# Patient Record
Sex: FEMALE | Race: ASIAN | URBAN - METROPOLITAN AREA
[De-identification: names, ages, dates, MRNs, and addresses within clinical notes are randomized per-mention and may not be internally consistent; named-entity substitution may affect disease eponyms.]

---

## 2017-05-01 ENCOUNTER — OFFICE VISIT (OUTPATIENT)
Dept: FAMILY MEDICINE | Facility: CLINIC | Age: 42
End: 2017-05-01
Payer: COMMERCIAL

## 2017-05-01 VITALS
TEMPERATURE: 98 F | HEART RATE: 73 BPM | BODY MASS INDEX: 21.47 KG/M2 | SYSTOLIC BLOOD PRESSURE: 92 MMHG | DIASTOLIC BLOOD PRESSURE: 57 MMHG | HEIGHT: 62 IN | WEIGHT: 116.7 LBS

## 2017-05-01 DIAGNOSIS — E55.9 VITAMIN D DEFICIENCY: ICD-10-CM

## 2017-05-01 DIAGNOSIS — R53.83 FATIGUE, UNSPECIFIED TYPE: Primary | ICD-10-CM

## 2017-05-01 DIAGNOSIS — R42 DIZZINESS: ICD-10-CM

## 2017-05-01 LAB
DEPRECATED CALCIDIOL+CALCIFEROL SERPL-MC: 26 UG/L (ref 20–75)
FERRITIN SERPL-MCNC: 231 NG/ML (ref 12–150)
HGB BLD-MCNC: 14 G/DL (ref 11.7–15.7)
IRON SATN MFR SERPL: 50 % (ref 15–46)
IRON SERPL-MCNC: 129 UG/DL (ref 35–180)
T4 FREE SERPL-MCNC: 1.06 NG/DL (ref 0.76–1.46)
TIBC SERPL-MCNC: 256 UG/DL (ref 240–430)
TSH SERPL DL<=0.005 MIU/L-ACNC: 0.37 MU/L (ref 0.4–4)

## 2017-05-01 PROCEDURE — 36415 COLL VENOUS BLD VENIPUNCTURE: CPT | Performed by: NURSE PRACTITIONER

## 2017-05-01 PROCEDURE — 82306 VITAMIN D 25 HYDROXY: CPT | Performed by: NURSE PRACTITIONER

## 2017-05-01 PROCEDURE — 84439 ASSAY OF FREE THYROXINE: CPT | Performed by: NURSE PRACTITIONER

## 2017-05-01 PROCEDURE — 83550 IRON BINDING TEST: CPT | Performed by: NURSE PRACTITIONER

## 2017-05-01 PROCEDURE — 83540 ASSAY OF IRON: CPT | Performed by: NURSE PRACTITIONER

## 2017-05-01 PROCEDURE — 84443 ASSAY THYROID STIM HORMONE: CPT | Performed by: NURSE PRACTITIONER

## 2017-05-01 PROCEDURE — 82728 ASSAY OF FERRITIN: CPT | Performed by: NURSE PRACTITIONER

## 2017-05-01 PROCEDURE — 85018 HEMOGLOBIN: CPT | Performed by: NURSE PRACTITIONER

## 2017-05-01 PROCEDURE — 99214 OFFICE O/P EST MOD 30 MIN: CPT | Performed by: NURSE PRACTITIONER

## 2017-05-01 RX ORDER — FERROUS SULFATE 325(65) MG
325 TABLET ORAL
Qty: 100 TABLET | Refills: 0 | Status: SHIPPED | OUTPATIENT
Start: 2017-05-01 | End: 2017-05-01 | Stop reason: DRUGHIGH

## 2017-05-01 RX ORDER — MECLIZINE HYDROCHLORIDE 25 MG/1
25 TABLET ORAL 3 TIMES DAILY PRN
Qty: 30 TABLET | Refills: 0 | Status: SHIPPED | OUTPATIENT
Start: 2017-05-01 | End: 2017-11-16

## 2017-05-01 ASSESSMENT — ANXIETY QUESTIONNAIRES
6. BECOMING EASILY ANNOYED OR IRRITABLE: SEVERAL DAYS
2. NOT BEING ABLE TO STOP OR CONTROL WORRYING: NOT AT ALL
3. WORRYING TOO MUCH ABOUT DIFFERENT THINGS: NOT AT ALL
4. TROUBLE RELAXING: SEVERAL DAYS
5. BEING SO RESTLESS THAT IT IS HARD TO SIT STILL: SEVERAL DAYS
GAD7 TOTAL SCORE: 4
7. FEELING AFRAID AS IF SOMETHING AWFUL MIGHT HAPPEN: NOT AT ALL
1. FEELING NERVOUS, ANXIOUS, OR ON EDGE: SEVERAL DAYS

## 2017-05-01 NOTE — PROGRESS NOTES
"  SUBJECTIVE:                                                    Soni Moody is a 41 year old female who presents to clinic today for the following health issues:chronic fatigue, feeling lightheaded sometimes, feels rooms spinning around with position changes occasionally. Denies shortness of breath, palpitations, chest pain, fever, nausea, abdominal pain, diarrhea, flank pain, hematuria, blood in stools. Denies headaches, blurry vision, or vision changes.     Reports history of low iron level in the past, was taking Ferrous sulfate 325 mg daily     Problem list and histories reviewed & adjusted, as indicated.  Additional history: as documented    Labs reviewed in EPIC    Reviewed and updated as needed this visit by clinical staff  Tobacco  Allergies  Med Hx  Surg Hx  Fam Hx  Soc Hx      Reviewed and updated as needed this visit by Provider         ROS:  Constitutional, HEENT, cardiovascular, pulmonary, gi and gu systems are negative, except as otherwise noted.    OBJECTIVE:                                                    BP 92/57  Pulse 73  Temp 98  F (36.7  C) (Tympanic)  Ht 5' 2.25\" (1.581 m)  Wt 116 lb 11.2 oz (52.9 kg)  BMI 21.17 kg/m2  Body mass index is 21.17 kg/(m^2).  GENERAL: healthy, alert and no distress  HENT: ear canals and TM's normal, nose and mouth without ulcers or lesions  NECK: no adenopathy, no asymmetry, masses, or scars and thyroid normal to palpation  RESP: lungs clear to auscultation - no rales, rhonchi or wheezes  CV: regular rate and rhythm, normal S1 S2, no S3 or S4, no murmur, click or rub, no peripheral edema and peripheral pulses strong  ABDOMEN: soft, nontender, no hepatosplenomegaly, no masses and bowel sounds normal  NEURO: Normal strength and tone, mentation intact and speech normal  PSYCH: mentation appears normal, affect normal/bright    Diagnostic Test Results:  Hemoglobin, Iron binding cap  Ferritin  TSH  Vitamin D level   All labs are pending    "     ASSESSMENT/PLAN:                                                      1. Fatigue, unspecified type  - ferrous sulfate (FEOSOL) 325 (65 FE) MG tablet; Take 1 tablet (325 mg) by mouth daily (with breakfast) Reported on 5/1/2017  Dispense: 100 tablet; Refill: 0-d/c, ferritin level is elevated  - Ferritin  - Hemoglobin  - Iron and iron binding capacity  - TSH with free T4 reflex  -TSH slightly suppressed, recommend to recheck in 1 week      2. Vitamin D deficiency  - Vitamin D Deficiency    3. Dizziness  -recommended to drink more fluids  -lab results reviewed from last visit, all labs were normal      - meclizine (ANTIVERT) 25 MG tablet; Take 1 tablet (25 mg) by mouth 3 times daily as needed for dizziness, or vertigo  Dispense: 30 tablet; Refill: 0    See Patient Instructions    MOISES Caballero [facility]

## 2017-05-01 NOTE — NURSING NOTE
"Chief Complaint   Patient presents with     other     Ever since last Friday she has had no energy, thinks her iron is low.      Refill Request     ferrous sulfate and calcium.       Initial BP 92/57  Pulse 73  Temp 98  F (36.7  C) (Tympanic)  Ht 5' 2.25\" (1.581 m)  Wt 116 lb 11.2 oz (52.9 kg)  BMI 21.17 kg/m2 Estimated body mass index is 21.17 kg/(m^2) as calculated from the following:    Height as of this encounter: 5' 2.25\" (1.581 m).    Weight as of this encounter: 116 lb 11.2 oz (52.9 kg).  Medication Reconciliation: complete  "

## 2017-05-01 NOTE — PATIENT INSTRUCTIONS
Iron  Hemoglobin  Thyroid  Vitamin D level    Drink more fluids  For dizziness try Meclizine 1 tablet 3 times daily as needed

## 2017-05-01 NOTE — MR AVS SNAPSHOT
"              After Visit Summary   5/1/2017    Soni Moody    MRN: [id number]           Patient Information     Date Of Birth          1975        Visit Information        Provider Department      5/1/2017 8:40 AM Parul Nieves APRN CNP [facility]        Today's Diagnoses     Fatigue, unspecified type    -  1    Vitamin D deficiency        Dizziness          Care Instructions    Iron  Hemoglobin  Thyroid  Vitamin D level    Drink more fluids  For dizziness try Meclizine 1 tablet 3 times daily as needed           Follow-ups after your visit        Who to contact     If you have questions or need follow up information about today's clinic visit or your schedule please contact [facility] directly at [phone].  Normal or non-critical lab and imaging results will be communicated to you by gumihart, letter or phone within 4 business days after the clinic has received the results. If you do not hear from us within 7 days, please contact the clinic through gumihart or phone. If you have a critical or abnormal lab result, we will notify you by phone as soon as possible.  Submit refill requests through "[employer] or call your pharmacy and they will forward the refill request to us. Please allow 3 business days for your refill to be completed.          Additional Information About Your Visit        MyChart Information     "[employer] lets you send messages to your doctor, view your test results, renew your prescriptions, schedule appointments and more. To sign up, go to [email or web address] . Click on \"Log in\" on the left side of the screen, which will take you to the Welcome page. Then click on \"Sign up Now\" on the right side of the page.     You will be asked to enter the access code listed below, as well as some personal information. Please follow the directions to create your username and password.     Your access code is: [email or web address]  Expires: 7/30/2017  9:07 AM     Your " "access code will  in 90 days. If you need help or a new code, please call your [place] clinic or [phone].        Care EveryWhere ID     This is your Care EveryWhere ID. This could be used by other organizations to access your [place] medical records  [id number]        Your Vitals Were     Pulse Temperature Height BMI (Body Mass Index)          73 98  F (36.7  C) (Tympanic) 5' 2.25\" (1.581 m) 21.17 kg/m2         Blood Pressure from Last 3 Encounters:   17 92/57   16 91/63   16 109/78    Weight from Last 3 Encounters:   17 116 lb 11.2 oz (52.9 kg)   16 116 lb (52.6 kg)   16 115 lb (52.2 kg)              We Performed the Following     Ferritin     Hemoglobin     Iron and iron binding capacity     TSH with free T4 reflex     Vitamin D Deficiency          Today's Medication Changes          These changes are accurate as of: 17  9:13 AM.  If you have any questions, ask your nurse or doctor.               Start taking these medicines.        Dose/Directions    meclizine 25 MG tablet   Commonly known as:  ANTIVERT   Used for:  Dizziness   Started by:  Parul Nieves APRN CNP        Dose:  25 mg   Take 1 tablet (25 mg) by mouth 3 times daily as needed for dizziness   Quantity:  30 tablet   Refills:  0         These medicines have changed or have updated prescriptions.        Dose/Directions    ferrous sulfate 325 (65 FE) MG tablet   Commonly known as:  FEOSOL   This may have changed:  how much to take   Used for:  Fatigue, unspecified type   Changed by:  Parul Nieves APRN CNP        Dose:  325 mg   Take 1 tablet (325 mg) by mouth daily (with breakfast) Reported on 2017   Quantity:  100 tablet   Refills:  0            Where to get your medicines      These medications were sent to [facility] [place], [place] - [address]  [address] [address]     Phone:  [phone]     ferrous sulfate 325 (65 FE) MG tablet    " meclizine 25 MG tablet                Primary Care Provider    None Specified       No primary provider on file.        Thank you!     Thank you for choosing [facility]  for your care. Our goal is always to provide you with excellent care. Hearing back from our patients is one way we can continue to improve our services. Please take a few minutes to complete the written survey that you may receive in the mail after your visit with us. Thank you!             Your Updated Medication List - Protect others around you: Learn how to safely use, store and throw away your medicines at www.disposemymeds.org.          This list is accurate as of: 5/1/17  9:13 AM.  Always use your most recent med list.                   Brand Name Dispense Instructions for use    CALCIUM 600 + D 600-200 MG-UNIT Tabs      1 qd       ferrous sulfate 325 (65 FE) MG tablet    FEOSOL    100 tablet    Take 1 tablet (325 mg) by mouth daily (with breakfast) Reported on 5/1/2017       meclizine 25 MG tablet    ANTIVERT    30 tablet    Take 1 tablet (25 mg) by mouth 3 times daily as needed for dizziness       NO ACTIVE MEDICATIONS

## 2017-05-02 ASSESSMENT — ANXIETY QUESTIONNAIRES: GAD7 TOTAL SCORE: 4

## 2017-05-02 ASSESSMENT — PATIENT HEALTH QUESTIONNAIRE - PHQ9: SUM OF ALL RESPONSES TO PHQ QUESTIONS 1-9: 6

## 2017-05-08 DIAGNOSIS — R53.83 FATIGUE, UNSPECIFIED TYPE: ICD-10-CM

## 2017-05-08 LAB — TSH SERPL DL<=0.05 MIU/L-ACNC: 0.53 MU/L (ref 0.4–4)

## 2017-05-08 PROCEDURE — 84443 ASSAY THYROID STIM HORMONE: CPT | Performed by: NURSE PRACTITIONER

## 2017-05-08 PROCEDURE — 36415 COLL VENOUS BLD VENIPUNCTURE: CPT | Performed by: NURSE PRACTITIONER

## 2017-11-16 ENCOUNTER — OFFICE VISIT (OUTPATIENT)
Dept: FAMILY MEDICINE | Facility: CLINIC | Age: 42
End: 2017-11-16
Payer: COMMERCIAL

## 2017-11-16 VITALS
DIASTOLIC BLOOD PRESSURE: 70 MMHG | WEIGHT: 121.8 LBS | SYSTOLIC BLOOD PRESSURE: 100 MMHG | HEIGHT: 62 IN | TEMPERATURE: 98.9 F | HEART RATE: 63 BPM | BODY MASS INDEX: 22.41 KG/M2

## 2017-11-16 DIAGNOSIS — Z80.0 FAMILY HISTORY OF LIVER CANCER: ICD-10-CM

## 2017-11-16 DIAGNOSIS — Z23 NEED FOR PROPHYLACTIC VACCINATION AND INOCULATION AGAINST INFLUENZA: ICD-10-CM

## 2017-11-16 DIAGNOSIS — Z30.431 IUD CHECK UP: ICD-10-CM

## 2017-11-16 DIAGNOSIS — D35.2 PROLACTINOMA (H): ICD-10-CM

## 2017-11-16 DIAGNOSIS — Z00.00 ROUTINE GENERAL MEDICAL EXAMINATION AT A HEALTH CARE FACILITY: Primary | ICD-10-CM

## 2017-11-16 PROCEDURE — 90471 IMMUNIZATION ADMIN: CPT | Performed by: FAMILY MEDICINE

## 2017-11-16 PROCEDURE — 90686 IIV4 VACC NO PRSV 0.5 ML IM: CPT | Performed by: FAMILY MEDICINE

## 2017-11-16 PROCEDURE — 99395 PREV VISIT EST AGE 18-39: CPT | Mod: 25 | Performed by: FAMILY MEDICINE

## 2017-11-16 NOTE — PROGRESS NOTES
SUBJECTIVE:   CC: Soni Moody is an 41 year old woman who presents for preventive health visit.     Healthy Habits:    Do you get at least three servings of calcium containing foods daily (dairy, green leafy vegetables, etc.)? yes    Amount of exercise or daily activities, outside of work: none    Problems taking medications regularly not applicable    Medication side effects: No    Have you had an eye exam in the past two years? About due    Do you see a dentist twice per year? no    Do you have sleep apnea, excessive snoring or daytime drowsiness?no        Today's PHQ-2 Score:   PHQ-2 ( 1999 [employer]) 11/16/2017 6/1/2016   Q1: Little interest or pleasure in doing things 0 0   Q2: Feeling down, depressed or hopeless 0 0   PHQ-2 Score 0 0         Abuse: Current or Past(Physical, Sexual or Emotional)- No  Do you feel safe in your environment - Yes  Social History   Substance Use Topics     Smoking status: Never Smoker     Smokeless tobacco: Never Used     Alcohol use No     The patient does not drink >3 drinks per day nor >7 drinks per week.    Reviewed orders with patient.  Reviewed health maintenance and updated orders accordingly - Yes  Labs reviewed in EPIC    Mammogram not appropriate for this patient based on age.    Pertinent mammograms are reviewed under the imaging tab.  History of abnormal Pap smear:   Last 3 Pap Results:   PAP (no units)   Date Value   03/29/2016 NIL   04/30/2013 NIL       Reviewed and updated as needed this visit by clinical staff  Tobacco  Allergies  Med Hx  Surg Hx  Fam Hx  Soc Hx        Reviewed and updated as needed this visit by Provider        Past Medical History:   Diagnosis Date     IUD (intrauterine device) in place     paragard IUD 3/05/09     Other and unspecified anterior pituitary hyperfunction      Prolactinoma (H)     pitutary abnormally, medicine to shink prolaction      Unspecified disorder of the pituitary gland and its hypothalamic control     pituitary  "tumor.      Past Surgical History:   Procedure Laterality Date     HC ENLARGE BREAST WITH IMPLANT       MA BREAST BILATERAL SCREEN ANALOG NON DIGITAL       Obstetric History       T0      L0     SAB0   TAB1   Ectopic0   Multiple0   Live Births0       # Outcome Date GA Lbr Scott/2nd Weight Sex Delivery Anes PTL Lv   4 Para 2009           3 Para 2006           2             1 TAB                   ROS:  C: NEGATIVE for fever, chills, change in weight  I: NEGATIVE for worrisome rashes, moles or lesions  E: NEGATIVE for vision changes or irritation  ENT: NEGATIVE for ear, mouth and throat problems  R: NEGATIVE for significant cough or SOB  B: NEGATIVE for masses, tenderness or discharge  CV: NEGATIVE for chest pain, palpitations or peripheral edema  GI: NEGATIVE for nausea, abdominal pain, heartburn, or change in bowel habits  : NEGATIVE for unusual urinary or vaginal symptoms. Periods are regular.  M: NEGATIVE for significant arthralgias or myalgia  N: NEGATIVE for weakness, dizziness or paresthesias  P: NEGATIVE for changes in mood or affect    OBJECTIVE:   /70  Pulse 63  Temp 98.9  F (37.2  C) (Tympanic)  Ht 5' 2.25\" (1.581 m)  Wt 121 lb 12.8 oz (55.2 kg)  LMP 10/28/2017 (Approximate)  BMI 22.1 kg/m2  EXAM:  GENERAL: healthy, alert and no distress  EYES: Eyes grossly normal to inspection, PERRL and conjunctivae and sclerae normal  HENT: ear canals and TM's normal, nose and mouth without ulcers or lesions  NECK: no adenopathy, no asymmetry, masses, or scars and thyroid normal to palpation  RESP: lungs clear to auscultation - no rales, rhonchi or wheezes  BREAST: normal without masses, tenderness or nipple discharge and no palpable axillary masses or adenopathy  CV: regular rate and rhythm, normal S1 S2, no S3 or S4, no murmur, click or rub, no peripheral edema and peripheral pulses strong  ABDOMEN: soft, nontender, no hepatosplenomegaly, no masses and bowel sounds normal   " "(female): normal female external genitalia, normal urethral meatus , vaginal mucosa pink, moist, well rugated and normal cervix, adnexae, and uterus without masses.  MS: no gross musculoskeletal defects noted, no edema  SKIN: no suspicious lesions or rashes  NEURO: Normal strength and tone, mentation intact and speech normal  PSYCH: mentation appears normal, affect normal/bright    ASSESSMENT/PLAN:   1. Routine general medical examination at a health care facility  Low risk cervical cancer, low risk breast cancer.    2. IUD check up  9 years with paraguard, needs replacement  - OB/GYN REFERRAL    3. Family history of liver cancer  Mother, labs last year were normal, no need to recheck today    4. Prolactinoma (H)  Prolactin normal in June, 16, normal.      COUNSELING:   Reviewed preventive health counseling, as reflected in patient instructions         reports that she has never smoked. She has never used smokeless tobacco.    Estimated body mass index is 22.1 kg/(m^2) as calculated from the following:    Height as of this encounter: 5' 2.25\" (1.581 m).    Weight as of this encounter: 121 lb 12.8 oz (55.2 kg).   Weight management plan noted, stable and monitoring    Counseling Resources:  ATP IV Guidelines  Pooled Cohorts Equation Calculator  Breast Cancer Risk Calculator  FRAX Risk Assessment  ICSI Preventive Guidelines  Dietary Guidelines for Americans, 2010  USDA's MyPlate  ASA Prophylaxis  Lung CA Screening    Neva Acevedo MD  [facility]  "

## 2017-11-16 NOTE — MR AVS SNAPSHOT
After Visit Summary   11/16/2017    Soni Moody    MRN: [id number]           Patient Information     Date Of Birth          1975        Visit Information        Provider Department      11/16/2017 8:00 AM Neva Acevedo MD [facility]        Today's Diagnoses     IUD check up    -  1      Care Instructions      Preventive Health Recommendations  Female Ages 26 - 39  Yearly exam:   See your health care provider every year in order to    Review health changes.     Discuss preventive care.      Review your medicines if you your doctor has prescribed any.    Until age 30: Get a Pap test every three years (more often if you have had an abnormal result).    After age 30: Talk to your doctor about whether you should have a Pap test every 3 years or have a Pap test with HPV screening every 5 years.   You do not need a Pap test if your uterus was removed (hysterectomy) and you have not had cancer.  You should be tested each year for STDs (sexually transmitted diseases), if you're at risk.   Talk to your provider about how often to have your cholesterol checked.  If you are at risk for diabetes, you should have a diabetes test (fasting glucose).  Shots: Get a flu shot each year. Get a tetanus shot every 10 years.   Nutrition:     Eat at least 5 servings of fruits and vegetables each day.    Eat whole-grain bread, whole-wheat pasta and brown rice instead of white grains and rice.    Talk to your provider about Calcium and Vitamin D.     Lifestyle    Exercise at least 150 minutes a week (30 minutes a day, 5 days of the week). This will help you control your weight and prevent disease.    Limit alcohol to one drink per day.    No smoking.     Wear sunscreen to prevent skin cancer.    See your dentist every six months for an exam and cleaning.            Follow-ups after your visit        Additional Services     OB/GYN REFERRAL       Your provider has referred you to:  NESTOR: Elizabeth  "[facility] [phone]   [email or web address]    IUD replacement    Please be aware that coverage of these services is subject to the terms and limitations of your health insurance plan.  Call member services at your health plan with any benefit or coverage questions.      Please bring the following with you to your appointment:    (1) Any X-Rays, CTs or MRIs which have been performed.  Contact the facility where they were done to arrange for  prior to your scheduled appointment.   (2) List of current medications   (3) This referral request   (4) Any documents/labs given to you for this referral                  Who to contact     If you have questions or need follow up information about today's clinic visit or your schedule please contact [facility] directly at [phone].  Normal or non-critical lab and imaging results will be communicated to you by MyChart, letter or phone within 4 business days after the clinic has received the results. If you do not hear from us within 7 days, please contact the clinic through MyChart or phone. If you have a critical or abnormal lab result, we will notify you by phone as soon as possible.  Submit refill requests through [employer] or call your pharmacy and they will forward the refill request to us. Please allow 3 business days for your refill to be completed.          Additional Information About Your Visit        Message [employer] customer insight Information     [employer] lets you send messages to your doctor, view your test results, renew your prescriptions, schedule appointments and more. To sign up, go to [email or web address] . Click on \"Log in\" on the left side of the screen, which will take you to the Welcome page. Then click on \"Sign up Now\" on the right side of the page.     You will be asked to enter the access code listed below, as well as some personal information. Please follow the directions to create your username and password.   " "  Your access code is: [email or web address]  Expires: 2018  8:30 AM     Your access code will  in 90 days. If you need help or a new code, please call your [place] clinic or [phone].        Care EveryWhere ID     This is your Care EveryWhere ID. This could be used by other organizations to access your [place] medical records  [id number]        Your Vitals Were     Pulse Temperature Height Last Period BMI (Body Mass Index)       63 98.9  F (37.2  C) (Tympanic) 5' 2.25\" (1.581 m) 10/28/2017 (Approximate) 22.1 kg/m2        Blood Pressure from Last 3 Encounters:   17 100/70   17 92/57   16 91/63    Weight from Last 3 Encounters:   17 121 lb 12.8 oz (55.2 kg)   17 116 lb 11.2 oz (52.9 kg)   16 116 lb (52.6 kg)              We Performed the Following     OB/GYN REFERRAL          Today's Medication Changes          These changes are accurate as of: 17  8:30 AM.  If you have any questions, ask your nurse or doctor.               Stop taking these medicines if you haven't already. Please contact your care team if you have questions.     meclizine 25 MG tablet   Commonly known as:  ANTIVERT   Stopped by:  Neva Acevedo MD                    Primary Care Provider Office Phone # Fax #    Neva Acevedo [phone] [address] [address]        Equal Access to Services     [facility]: Hadii nuria elizalde hadasho Sotod, waaxda luqadaha, qaybta kaalmaromario agarwal idiin hayaan adeeg kharash la'aan . So [facility] [phone].    ATENCIÓN: Si habla español, tiene a haynes disposición servicios gratuitos de asistencia lingüística. Llame al [phone].    We comply with applicable federal civil rights laws and [place] laws. We do not discriminate on the basis of race, color, national origin, age, disability, sex, sexual orientation, or gender identity.            Thank you!     Thank you for choosing [facility]  for " your care. Our goal is always to provide you with excellent care. Hearing back from our patients is one way we can continue to improve our services. Please take a few minutes to complete the written survey that you may receive in the mail after your visit with us. Thank you!             Your Updated Medication List - Protect others around you: Learn how to safely use, store and throw away your medicines at www.disposemymeds.org.          This list is accurate as of: 11/16/17  8:30 AM.  Always use your most recent med list.                   Brand Name Dispense Instructions for use Diagnosis    CALCIUM 600 + D 600-200 MG-UNIT Tabs      1 qd        NO ACTIVE MEDICATIONS

## 2017-11-16 NOTE — NURSING NOTE
"Initial /70  Pulse 63  Temp 98.9  F (37.2  C) (Tympanic)  Ht 5' 2.25\" (1.581 m)  Wt 121 lb 12.8 oz (55.2 kg)  LMP 10/28/2017 (Approximate)  BMI 22.1 kg/m2 Estimated body mass index is 22.1 kg/(m^2) as calculated from the following:    Height as of this encounter: 5' 2.25\" (1.581 m).    Weight as of this encounter: 121 lb 12.8 oz (55.2 kg). .      "

## 2017-11-16 NOTE — PROGRESS NOTES

## 2018-02-19 ENCOUNTER — OFFICE VISIT (OUTPATIENT)
Dept: OBGYN | Facility: CLINIC | Age: 43
End: 2018-02-19
Payer: COMMERCIAL

## 2018-02-19 VITALS
BODY MASS INDEX: 20.98 KG/M2 | SYSTOLIC BLOOD PRESSURE: 104 MMHG | DIASTOLIC BLOOD PRESSURE: 68 MMHG | TEMPERATURE: 98.4 F | RESPIRATION RATE: 16 BRPM | HEIGHT: 62 IN | WEIGHT: 114 LBS | HEART RATE: 89 BPM

## 2018-02-19 DIAGNOSIS — Z30.430 ENCOUNTER FOR INSERTION OF MIRENA IUD: ICD-10-CM

## 2018-02-19 DIAGNOSIS — Z30.430 ENCOUNTER FOR INSERTION OF MIRENA IUD: Primary | ICD-10-CM

## 2018-02-19 DIAGNOSIS — Z30.433 ENCOUNTER FOR REMOVAL AND REINSERTION OF IUD: ICD-10-CM

## 2018-02-19 PROCEDURE — 58300 INSERT INTRAUTERINE DEVICE: CPT | Performed by: OBSTETRICS & GYNECOLOGY

## 2018-02-19 PROCEDURE — 58301 REMOVE INTRAUTERINE DEVICE: CPT | Performed by: OBSTETRICS & GYNECOLOGY

## 2018-02-19 NOTE — MR AVS SNAPSHOT
"              After Visit Summary   2018    Soni Moody    MRN: [id number]           Patient Information     Date Of Birth          1975        Visit Information        Provider Department      2018 8:15 AM Keli Rebollar MD [facility]        Today's Diagnoses     Encounter for insertion of mirena IUD    -  1    Encounter for removal and reinsertion of IUD        mirena IUD           Follow-ups after your visit        Who to contact     If you have questions or need follow up information about today's clinic visit or your schedule please contact [facility] directly at [phone].  Normal or non-critical lab and imaging results will be communicated to you by [employer], letter or phone within 4 business days after the clinic has received the results. If you do not hear from us within 7 days, please contact the clinic through [employer] or phone. If you have a critical or abnormal lab result, we will notify you by phone as soon as possible.  Submit refill requests through [employer] or call your pharmacy and they will forward the refill request to us. Please allow 3 business days for your refill to be completed.          Additional Information About Your Visit        MyChart Information     [employer] lets you send messages to your doctor, view your test results, renew your prescriptions, schedule appointments and more. To sign up, go to [email or web address] . Click on \"Log in\" on the left side of the screen, which will take you to the Welcome page. Then click on \"Sign up Now\" on the right side of the page.     You will be asked to enter the access code listed below, as well as some personal information. Please follow the directions to create your username and password.     Your access code is: [email or web address]  Expires: 2018  8:59 AM     Your access code will  in 90 days. If you need help or a new code, please call your [facility] or [phone].      " "  Care EveryWhere ID     This is your Care EveryWhere ID. This could be used by other organizations to access your [place] medical records  [id number]        Your Vitals Were     Pulse Temperature Respirations Height Breastfeeding? BMI (Body Mass Index)    89 98.4  F (36.9  C) 16 5' 2.25\" (1.581 m) No 20.68 kg/m2       Blood Pressure from Last 3 Encounters:   02/19/18 104/68   11/16/17 100/70   05/01/17 92/57    Weight from Last 3 Encounters:   02/19/18 114 lb (51.7 kg)   11/16/17 121 lb 12.8 oz (55.2 kg)   05/01/17 116 lb 11.2 oz (52.9 kg)              We Performed the Following     HC LEVONORGESTREL IU 52MG 5 YR     INSERTION INTRAUTERINE DEVICE     REMOVE INTRAUTERINE DEVICE          Today's Medication Changes          These changes are accurate as of 2/19/18  8:59 AM.  If you have any questions, ask your nurse or doctor.               Start taking these medicines.        Dose/Directions    levonorgestrel 20 MCG/24HR IUD   Commonly known as:  MIRENA   Used for:  Encounter for insertion of mirena IUD   Started by:  Keli Rebollar MD        Dose:  1 each   1 each (20 mcg) by Intrauterine route continuous   Refills:  0            Where to get your medicines      Some of these will need a paper prescription and others can be bought over the counter.  Ask your nurse if you have questions.     You don't need a prescription for these medications     levonorgestrel 20 MCG/24HR IUD                Primary Care Provider Office Phone # Fax #    Neva Tierney Acevedo [phone] [address] [facility] [address]        Equal Access to Services     KANDI LOYD : Hadrogelio Montes, yahir mckeon, romario messer. So [facility] [phone].    ATENCIÓN: Si habla español, tiene a haynes disposición servicios gratuitos de asistencia lingüística. Llame al [phone].    We comply with applicable federal civil rights laws and [place] " laws. We do not discriminate on the basis of race, color, national origin, age, disability, sex, sexual orientation, or gender identity.            Thank you!     Thank you for choosing [facility]  for your care. Our goal is always to provide you with excellent care. Hearing back from our patients is one way we can continue to improve our services. Please take a few minutes to complete the written survey that you may receive in the mail after your visit with us. Thank you!             Your Updated Medication List - Protect others around you: Learn how to safely use, store and throw away your medicines at www.disposemymeds.org.          This list is accurate as of 2/19/18  8:59 AM.  Always use your most recent med list.                   Brand Name Dispense Instructions for use Diagnosis    CALCIUM 600 + D 600-200 MG-UNIT Tabs      1 qd        levonorgestrel 20 MCG/24HR IUD    MIRENA     1 each (20 mcg) by Intrauterine route continuous    Encounter for insertion of mirena IUD       NO ACTIVE MEDICATIONS

## 2018-02-19 NOTE — NURSING NOTE
"Chief Complaint   Patient presents with     Minor Procedure       Initial /68 (BP Location: Right arm, Patient Position: Sitting, Cuff Size: Adult Regular)  Pulse 89  Temp 98.4  F (36.9  C)  Resp 16  Ht 5' 2.25\" (1.581 m)  Wt 114 lb (51.7 kg)  Breastfeeding? No  BMI 20.68 kg/m2 Estimated body mass index is 20.68 kg/(m^2) as calculated from the following:    Height as of this encounter: 5' 2.25\" (1.581 m).    Weight as of this encounter: 114 lb (51.7 kg).  Medication Reconciliation: complete   Pattie Sauer CMA      "

## 2018-02-19 NOTE — PROGRESS NOTES
"  SUBJECTIVE:                                                   CC:  Patient presents with:  Minor Procedure      HPI:  Soni Moody is a 42 year old  who presents for removal and reinsertion of IUD.  She has the paragard IUD, placed 9 years ago.  No signs of menopause yet, still gets regular periods on the IUD.   Not due for pap smear until next year.  No concern for STI, declines any testing of that today.  Gets regular annual visits with family medicine.      ROS: 10 point ROS negative other than as listed above in HPI.    Gyn History:  Patient is sexually active.  Using IUD for contraception.   Recent pap smears:    Lab Results   Component Value Date    PAP NIL 2016    PAP NIL 2013       PMH, PSH, Soc Hx, Fam Hx, Meds, and allergies reviewed in Epic.    OBJECTIVE:     /68 (BP Location: Right arm, Patient Position: Sitting, Cuff Size: Adult Regular)  Pulse 89  Temp 98.4  F (36.9  C)  Resp 16  Ht 5' 2.25\" (1.581 m)  Wt 114 lb (51.7 kg)  Breastfeeding? No  BMI 20.68 kg/m2    Gen: Healthy appearing thin female, no acute distress, comfortable  HENT: No scleral injection or icterus  CV: Regular rate  Resp: Normal work of breathing, no cough  GI: Abdomen soft, non-tender. No masses, organomegaly  Skin: No suspicious lesions or rashes  Psychiatric: mentation appears normal and affect bright  : Normal external female genitalia.  No external lesions, normal hair distribution.   SSE: Speculum exam reveals vaginal epithelium well rugated with normal physiologic discharge. Cervix appears smooth, pink, patulous, with no visible lesions.  White paragard IUD strings visualized.    IUD Insertion Procedure Note  2018     The patient was counseled on the risks, benefits, and alternatives of the procedure. Verbal and written consent were obtained.  Discussed r/b/a of Mirena versus paragard IUD; she desires to try the Mirena IUD.    The patient was placed in the dorsal lithotomy position.  A " bimanual exam revealed an anteverted uterus.  A speculum was inserted without difficulty.  The white strings of the paragard were visualized and grasped.  The entire IUD was removed intact.  The cervix was cleaned with betadine.  The uterus was then sounded to 7cm using the endometrial pipelle. A Mirena IUD was inserted in a sterile fashion and placed in the uterus with a 3cm tail. The patient tolerated the procedure with no complications.     Keli Rebollar MD      ASSESSMENT/PLAN:                                                      1. Encounter for insertion of mirena IUD  The patient was instructed to return to clinic in three to four weeks to check the length of the strings if she could not feel them herself at home. Also instructed to call with symptoms of infection such as a fever, heavy bleeding, or severe pain not controlled with over the counter medication. She was advised to use ibuprofen as needed for mild to moderate pain.  She was counseled that the IUD does not protect against STIs and that she will need to have a new device placed in 5 years.  All pt questions were answered.    - HC LEVONORGESTREL IU 52MG 5 YR  - levonorgestrel (MIRENA) 20 MCG/24HR IUD; 1 each (20 mcg) by Intrauterine route continuous  - INSERTION INTRAUTERINE DEVICE    2. Encounter for removal and reinsertion of IUD  She was initially unsure which type of IUD she desired to have placed, but after counseling on the risks and benefits of different types, elected for the Mirena IUD (s/p insertion as above).  - REMOVE INTRAUTERINE DEVICE      Keli Rebollar MD, MPH  Obstetrics and Gynecology

## 2018-10-22 ENCOUNTER — OFFICE VISIT (OUTPATIENT)
Dept: FAMILY MEDICINE | Facility: CLINIC | Age: 43
End: 2018-10-22
Payer: COMMERCIAL

## 2018-10-22 VITALS
DIASTOLIC BLOOD PRESSURE: 58 MMHG | WEIGHT: 112.6 LBS | OXYGEN SATURATION: 99 % | BODY MASS INDEX: 20.43 KG/M2 | HEART RATE: 94 BPM | SYSTOLIC BLOOD PRESSURE: 90 MMHG | TEMPERATURE: 98.4 F

## 2018-10-22 DIAGNOSIS — R61 NIGHT SWEATS: ICD-10-CM

## 2018-10-22 DIAGNOSIS — R53.83 FATIGUE, UNSPECIFIED TYPE: ICD-10-CM

## 2018-10-22 DIAGNOSIS — Z23 NEED FOR PROPHYLACTIC VACCINATION AND INOCULATION AGAINST INFLUENZA: Primary | ICD-10-CM

## 2018-10-22 DIAGNOSIS — R79.89 PROLACTIN INCREASED: ICD-10-CM

## 2018-10-22 DIAGNOSIS — D35.2 PROLACTINOMA (H): ICD-10-CM

## 2018-10-22 LAB
ALBUMIN SERPL-MCNC: 3.3 G/DL (ref 3.4–5)
ALP SERPL-CCNC: 38 U/L (ref 40–150)
ALT SERPL W P-5'-P-CCNC: 125 U/L (ref 0–50)
ANION GAP SERPL CALCULATED.3IONS-SCNC: 4 MMOL/L (ref 3–14)
AST SERPL W P-5'-P-CCNC: 66 U/L (ref 0–45)
BASOPHILS # BLD AUTO: 0 10E9/L (ref 0–0.2)
BASOPHILS NFR BLD AUTO: 0.7 %
BILIRUB SERPL-MCNC: 1 MG/DL (ref 0.2–1.3)
BUN SERPL-MCNC: 13 MG/DL (ref 7–30)
CALCIUM SERPL-MCNC: 8.7 MG/DL (ref 8.5–10.1)
CHLORIDE SERPL-SCNC: 110 MMOL/L (ref 94–109)
CO2 SERPL-SCNC: 27 MMOL/L (ref 20–32)
CREAT SERPL-MCNC: 0.41 MG/DL (ref 0.52–1.04)
CRP SERPL-MCNC: <2.9 MG/L (ref 0–8)
DIFFERENTIAL METHOD BLD: ABNORMAL
EOSINOPHIL # BLD AUTO: 0 10E9/L (ref 0–0.7)
EOSINOPHIL NFR BLD AUTO: 1.4 %
ERYTHROCYTE [DISTWIDTH] IN BLOOD BY AUTOMATED COUNT: 10.3 % (ref 10–15)
ERYTHROCYTE [SEDIMENTATION RATE] IN BLOOD BY WESTERGREN METHOD: 11 MM/H (ref 0–20)
FSH SERPL-ACNC: 5.1 IU/L
GFR SERPL CREATININE-BSD FRML MDRD: >90 ML/MIN/1.7M2
GLUCOSE SERPL-MCNC: 131 MG/DL (ref 70–99)
HCT VFR BLD AUTO: 36.6 % (ref 35–47)
HGB BLD-MCNC: 12.2 G/DL (ref 11.7–15.7)
LYMPHOCYTES # BLD AUTO: 0.9 10E9/L (ref 0.8–5.3)
LYMPHOCYTES NFR BLD AUTO: 29.2 %
MCH RBC QN AUTO: 32.1 PG (ref 26.5–33)
MCHC RBC AUTO-ENTMCNC: 33.3 G/DL (ref 31.5–36.5)
MCV RBC AUTO: 96 FL (ref 78–100)
MONOCYTES # BLD AUTO: 0.5 10E9/L (ref 0–1.3)
MONOCYTES NFR BLD AUTO: 18.3 %
NEUTROPHILS # BLD AUTO: 1.5 10E9/L (ref 1.6–8.3)
NEUTROPHILS NFR BLD AUTO: 50.4 %
PLATELET # BLD AUTO: 183 10E9/L (ref 150–450)
POTASSIUM SERPL-SCNC: 4.4 MMOL/L (ref 3.4–5.3)
PROLACTIN SERPL-MCNC: 18 UG/L (ref 3–27)
PROT SERPL-MCNC: 6.8 G/DL (ref 6.8–8.8)
RBC # BLD AUTO: 3.8 10E12/L (ref 3.8–5.2)
SODIUM SERPL-SCNC: 141 MMOL/L (ref 133–144)
T4 FREE SERPL-MCNC: 4.01 NG/DL (ref 0.76–1.46)
TSH SERPL DL<=0.005 MIU/L-ACNC: <0.01 MU/L (ref 0.4–4)
WBC # BLD AUTO: 3 10E9/L (ref 4–11)

## 2018-10-22 PROCEDURE — 84146 ASSAY OF PROLACTIN: CPT | Performed by: FAMILY MEDICINE

## 2018-10-22 PROCEDURE — 84443 ASSAY THYROID STIM HORMONE: CPT | Performed by: FAMILY MEDICINE

## 2018-10-22 PROCEDURE — 90471 IMMUNIZATION ADMIN: CPT | Performed by: FAMILY MEDICINE

## 2018-10-22 PROCEDURE — 83001 ASSAY OF GONADOTROPIN (FSH): CPT | Performed by: FAMILY MEDICINE

## 2018-10-22 PROCEDURE — 99214 OFFICE O/P EST MOD 30 MIN: CPT | Mod: 25 | Performed by: FAMILY MEDICINE

## 2018-10-22 PROCEDURE — 36415 COLL VENOUS BLD VENIPUNCTURE: CPT | Performed by: FAMILY MEDICINE

## 2018-10-22 PROCEDURE — 85025 COMPLETE CBC W/AUTO DIFF WBC: CPT | Performed by: FAMILY MEDICINE

## 2018-10-22 PROCEDURE — 86140 C-REACTIVE PROTEIN: CPT | Performed by: FAMILY MEDICINE

## 2018-10-22 PROCEDURE — 87389 HIV-1 AG W/HIV-1&-2 AB AG IA: CPT | Performed by: FAMILY MEDICINE

## 2018-10-22 PROCEDURE — 90686 IIV4 VACC NO PRSV 0.5 ML IM: CPT | Performed by: FAMILY MEDICINE

## 2018-10-22 PROCEDURE — 85652 RBC SED RATE AUTOMATED: CPT | Performed by: FAMILY MEDICINE

## 2018-10-22 PROCEDURE — 84439 ASSAY OF FREE THYROXINE: CPT | Performed by: FAMILY MEDICINE

## 2018-10-22 PROCEDURE — 80053 COMPREHEN METABOLIC PANEL: CPT | Performed by: FAMILY MEDICINE

## 2018-10-22 NOTE — MR AVS SNAPSHOT
"              After Visit Summary   10/22/2018    Soni Moody    MRN: [id number]           Patient Information     Date Of Birth          1975        Visit Information        Provider Department      10/22/2018 8:20 AM Neva Acevedo MD [facility]        Today's Diagnoses     Need for prophylactic vaccination and inoculation against influenza    -  1    Prolactinoma (H)        Prolactin increased (H)        Fatigue, unspecified type        Night sweats           Follow-ups after your visit        Future tests that were ordered for you today     Open Future Orders        Priority Expected Expires Ordered    Hepatitis C antibody Routine  11/21/2018 10/22/2018            Who to contact     If you have questions or need follow up information about today's clinic visit or your schedule please contact [facility] directly at [phone].  Normal or non-critical lab and imaging results will be communicated to you by MyChart, letter or phone within 4 business days after the clinic has received the results. If you do not hear from us within 7 days, please contact the clinic through MyChart or phone. If you have a critical or abnormal lab result, we will notify you by phone as soon as possible.  Submit refill requests through [employer] or call your pharmacy and they will forward the refill request to us. Please allow 3 business days for your refill to be completed.          Additional Information About Your Visit        MyChart Information     [employer] lets you send messages to your doctor, view your test results, renew your prescriptions, schedule appointments and more. To sign up, go to [email or web address] . Click on \"Log in\" on the left side of the screen, which will take you to the Welcome page. Then click on \"Sign up Now\" on the right side of the page.     You will be asked to enter the access code listed below, as well as some personal information. Please follow the " directions to create your username and password.     Your access code is: [email or web address]  Expires: 2019  8:09 AM     Your access code will  in 90 days. If you need help or a new code, please call your [place] clinic or [phone].        Care EveryWhere ID     This is your Care EveryWhere ID. This could be used by other organizations to access your [place] medical records  [id number]        Your Vitals Were     Pulse Temperature Pulse Oximetry BMI (Body Mass Index)          94 98.4  F (36.9  C) (Tympanic) 99% 20.43 kg/m2         Blood Pressure from Last 3 Encounters:   10/22/18 90/58   18 104/68   17 100/70    Weight from Last 3 Encounters:   10/22/18 112 lb 9.6 oz (51.1 kg)   18 114 lb (51.7 kg)   17 121 lb 12.8 oz (55.2 kg)              We Performed the Following     CBC with platelets differential     Comprehensive metabolic panel     CRP inflammation     Erythrocyte sedimentation rate auto     FLU VACCINE, SPLIT VIRUS, IM (QUADRIVALENT) [17896]- >3 YRS     Follicle stimulating hormone     HIV Antigen Antibody Combo     Prolactin     T4 FREE     TSH     Vaccine Administration, Initial [74136]        Primary Care Provider Office Phone # Fax #    Neva Tierney Acevedo [phone] [address] [facility] [address]        Equal Access to Services     KANDI LOYD AH: Hadii aad ku hadasho Soceliaali, waaxda luqadaha, qaybta kaalmada adeegyada, romario alvarez . So [facility] [phone].    ATENCIÓN: Si habla español, tiene a haynes disposición servicios gratuitos de asistencia lingüística. Llame al [phone].    We comply with applicable federal civil rights laws and [place] laws. We do not discriminate on the basis of race, color, national origin, age, disability, sex, sexual orientation, or gender identity.            Thank you!     Thank you for choosing [facility]  for your care. Our goal is always to provide you with  excellent care. Hearing back from our patients is one way we can continue to improve our services. Please take a few minutes to complete the written survey that you may receive in the mail after your visit with us. Thank you!             Your Updated Medication List - Protect others around you: Learn how to safely use, store and throw away your medicines at www.disposemymeds.org.          This list is accurate as of 10/22/18  9:03 AM.  Always use your most recent med list.                   Brand Name Dispense Instructions for use Diagnosis    CALCIUM 600 + D 600-200 MG-UNIT Tabs      1 qd        levonorgestrel 20 MCG/24HR IUD    MIRENA     1 each (20 mcg) by Intrauterine route continuous    Encounter for insertion of mirena IUD       NO ACTIVE MEDICATIONS

## 2018-10-22 NOTE — PROGRESS NOTES

## 2018-10-22 NOTE — PROGRESS NOTES
Thyroid, blood sugar and liver tests are high normal. Need to return to lab for further tests and then see provider next week. Please  notify.        Thank you. MARILYN GUPTA MD

## 2018-10-22 NOTE — PROGRESS NOTES
SUBJECTIVE:                                                    Soni Moody is 42 year old female   Chief Complaint   Patient presents with     Fatigue     Patient c/o sweating, fatigue x 2-3 weeks    Problem list and histories reviewed & adjusted, as indicated.  Additional history: menses began age 13.  No periods now as has mirena IUD.  Weight low but at her usual, would like to gain 10 lbs.  Walks a lot at work.  Sleep is good, no naps.    Patient Active Problem List   Diagnosis     CARDIOVASCULAR SCREENING; LDL GOAL LESS THAN 160     Titers for Hep A and Rubella shows immunity, [facility].     Prolactinoma (H)     Dental infection     Family history of liver cancer     Other anterior pituitary disorders     mirena IUD     Past Surgical History:   Procedure Laterality Date     HC ENLARGE BREAST WITH IMPLANT       MA BREAST BILATERAL SCREEN ANALOG NON DIGITAL         Social History   Substance Use Topics     Smoking status: Never Smoker     Smokeless tobacco: Never Used     Alcohol use No     Family History   Problem Relation Age of Onset     Cancer Mother       43yo /liver     Hypertension Father      Family History Negative Brother          Current Outpatient Prescriptions   Medication Sig Dispense Refill     CALCIUM 600 + D 600-200 MG-UNIT OR TABS 1 qd  0     levonorgestrel (MIRENA) 20 MCG/24HR IUD 1 each (20 mcg) by Intrauterine route continuous       NO ACTIVE MEDICATIONS        Allergies   Allergen Reactions     Amoxicillin Rash     Recent Labs   Lab Test  10/22/18   0905  17   0711   16   1024  10/06/14   0834  13   0818 10/14/11   LDL   --    --    --    --    --   95   --    HDL   --    --    --    --    --   53  58   TRIG   --    --    --    --    --   92   --    ALT  125*   --    --   24  26   --    --    CR  0.41*   --    --   0.62  0.63   --    --    GFRESTIMATED  >90   --    --   >90  Non  GFR Calc    >90  Non  GFR  Calc     --    --    GFRESTBLACK  >90   --    --   >90   GFR Calc    >90   GFR Calc     --    --    POTASSIUM  4.4   --    --   4.2  4.2   --    --    TSH  <0.01*  0.53   < >  1.81   --    --    --     < > = values in this interval not displayed.      BP Readings from Last 3 Encounters:   10/22/18 90/58   02/19/18 104/68   11/16/17 100/70    Wt Readings from Last 3 Encounters:   10/22/18 112 lb 9.6 oz (51.1 kg)   02/19/18 114 lb (51.7 kg)   11/16/17 121 lb 12.8 oz (55.2 kg)         ROS:  Constitutional, HEENT, cardiovascular, pulmonary, gi and gu systems are negative, except as otherwise noted.    OBJECTIVE:                                                    BP 90/58 (BP Location: Right arm, Patient Position: Chair, Cuff Size: Adult Regular)  Pulse 94  Temp 98.4  F (36.9  C) (Tympanic)  Wt 112 lb 9.6 oz (51.1 kg)  SpO2 99%  BMI 20.43 kg/m2  GENERAL APPEARANCE ADULT: Alert, no acute distress, fit appearing  HENT: Ears and TMs normal, oral mucosa and posterior oropharynx normal  RESP: lungs clear to auscultation   CV: normal rate, regular rhythm, no murmur or gallop  ABDOMEN: soft, no organomegaly, masses or tenderness  NEURO: Alert, oriented, speech and mentation normal  PSYCH: mentation appears normal., affect and mood normal  Diagnostic Test Results:  Results for orders placed or performed in visit on 10/22/18   Comprehensive metabolic panel   Result Value Ref Range    Sodium 141 133 - 144 mmol/L    Potassium 4.4 3.4 - 5.3 mmol/L    Chloride 110 (H) 94 - 109 mmol/L    Carbon Dioxide 27 20 - 32 mmol/L    Anion Gap 4 3 - 14 mmol/L    Glucose 131 (H) 70 - 99 mg/dL    Urea Nitrogen 13 7 - 30 mg/dL    Creatinine 0.41 (L) 0.52 - 1.04 mg/dL    GFR Estimate >90 >60 mL/min/1.7m2    GFR Estimate If Black >90 >60 mL/min/1.7m2    Calcium 8.7 8.5 - 10.1 mg/dL    Bilirubin Total 1.0 0.2 - 1.3 mg/dL    Albumin 3.3 (L) 3.4 - 5.0 g/dL    Protein Total 6.8 6.8 - 8.8 g/dL    Alkaline Phosphatase  38 (L) 40 - 150 U/L     (H) 0 - 50 U/L    AST 66 (H) 0 - 45 U/L   CBC with platelets differential   Result Value Ref Range    WBC 3.0 (L) 4.0 - 11.0 10e9/L    RBC Count 3.80 3.8 - 5.2 10e12/L    Hemoglobin 12.2 11.7 - 15.7 g/dL    Hematocrit 36.6 35.0 - 47.0 %    MCV 96 78 - 100 fl    MCH 32.1 26.5 - 33.0 pg    MCHC 33.3 31.5 - 36.5 g/dL    RDW 10.3 10.0 - 15.0 %    Platelet Count 183 150 - 450 10e9/L    % Neutrophils 50.4 %    % Lymphocytes 29.2 %    % Monocytes 18.3 %    % Eosinophils 1.4 %    % Basophils 0.7 %    Absolute Neutrophil 1.5 (L) 1.6 - 8.3 10e9/L    Absolute Lymphocytes 0.9 0.8 - 5.3 10e9/L    Absolute Monocytes 0.5 0.0 - 1.3 10e9/L    Absolute Eosinophils 0.0 0.0 - 0.7 10e9/L    Absolute Basophils 0.0 0.0 - 0.2 10e9/L    Diff Method Automated Method    CRP inflammation   Result Value Ref Range    CRP Inflammation <2.9 0.0 - 8.0 mg/L   Erythrocyte sedimentation rate auto   Result Value Ref Range    Sed Rate 11 0 - 20 mm/h   TSH   Result Value Ref Range    TSH <0.01 (L) 0.40 - 4.00 mU/L   T4 FREE   Result Value Ref Range    T4 Free 4.01 (H) 0.76 - 1.46 ng/dL          ASSESSMENT/PLAN:                                                    1. Need for prophylactic vaccination and inoculation against influenza  - FLU VACCINE, SPLIT VIRUS, IM (QUADRIVALENT) [57125]- >3 YRS  - Vaccine Administration, Initial [25452]    2. Prolactinoma (H)  3. Prolactin increased (H)  History, due for level check  - Prolactin    4. Fatigue, unspecified type  Vague, will check labs for anemia, liver or kidney disorder, inflammation.  Liver tests and glucose elevated, check a1c.  - Comprehensive metabolic panel  - CBC with platelets differential  - CRP inflammation  - Erythrocyte sedimentation rate auto  - Hepatitis C antibody; Future  - HIV Antigen Antibody Combo    5. Night sweats  May be starting menopause.  Thyroid level is high, will get T3  - Follicle stimulating hormone  - TSH  - T4 FREE    Neva Acevedo,  MD  [facility]

## 2018-10-22 NOTE — NURSING NOTE
"Initial BP 90/58 (BP Location: Right arm, Patient Position: Chair, Cuff Size: Adult Regular)  Pulse 94  Temp 98.4  F (36.9  C) (Tympanic)  Wt 112 lb 9.6 oz (51.1 kg)  SpO2 99%  BMI 20.43 kg/m2 Estimated body mass index is 20.43 kg/(m^2) as calculated from the following:    Height as of 2/19/18: 5' 2.25\" (1.581 m).    Weight as of this encounter: 112 lb 9.6 oz (51.1 kg). .    Karishma Marino    "

## 2018-10-22 NOTE — PROGRESS NOTES
Prolactin and FSH normal,  Awaiting T3 and a1c.  Please notify.        Thank you. MARILYN GUPTA MD

## 2018-10-23 DIAGNOSIS — E05.90 HYPERTHYROIDISM: Primary | ICD-10-CM

## 2018-10-23 DIAGNOSIS — R53.83 FATIGUE, UNSPECIFIED TYPE: ICD-10-CM

## 2018-10-23 LAB
HBA1C MFR BLD: 5.2 % (ref 0–5.6)
HIV 1+2 AB+HIV1 P24 AG SERPL QL IA: NONREACTIVE
T3 SERPL-MCNC: 406 NG/DL (ref 60–181)

## 2018-10-23 PROCEDURE — 86803 HEPATITIS C AB TEST: CPT | Performed by: FAMILY MEDICINE

## 2018-10-23 PROCEDURE — 84480 ASSAY TRIIODOTHYRONINE (T3): CPT | Performed by: FAMILY MEDICINE

## 2018-10-23 PROCEDURE — 83036 HEMOGLOBIN GLYCOSYLATED A1C: CPT | Performed by: FAMILY MEDICINE

## 2018-10-23 PROCEDURE — 36415 COLL VENOUS BLD VENIPUNCTURE: CPT | Performed by: FAMILY MEDICINE

## 2018-10-23 NOTE — PROGRESS NOTES
Needs to return to lab for additional tests, thyroid and glucose not normal.  Please notify.        Thank you. MARILYN GUPTA MD

## 2018-10-24 LAB — HCV AB SERPL QL IA: NONREACTIVE

## 2018-10-26 RX ORDER — ATENOLOL 25 MG/1
25 TABLET ORAL DAILY
Qty: 90 TABLET | Refills: 1 | Status: SHIPPED | OUTPATIENT
Start: 2018-10-26 | End: 2019-05-07

## 2018-10-26 NOTE — PROGRESS NOTES
Thyroid it too high, not normal and cause of sxs.  Start on atenolol and see endocrinology.  Please notify.  Orders in [facility] [employer] to pharmacy here.        Thank you. MARILYN GUPTA MD

## 2018-10-29 ENCOUNTER — TELEPHONE (OUTPATIENT)
Dept: FAMILY MEDICINE | Facility: CLINIC | Age: 43
End: 2018-10-29

## 2018-10-29 NOTE — TELEPHONE ENCOUNTER
Dr. Acevedo,    Patient is calling to ensure that the atenolol is the correct medication for her thyroid as the pharmacy told her bp.  I have informed the patient it helps with pulse or heart rate too.  Patient does have a endocrine appt next week.  Is this medication for her thyroid.? Rylee GALEAS RN

## 2018-10-29 NOTE — TELEPHONE ENCOUNTER
Reason for call:  Patient reporting a symptom    Symptom or request: Pt saw Dr. Acevedo in clinic last week and when she went to  the Rx at her pharmacy, the pharmacist told her it was for something she doesn't have  Please call patient and advise.      Duration (how long have symptoms been present): ongoing    Have you been treated for this before? Yes    Additional comments:     Phone Number patient can be reached at:  Home number on file [phone] (home)    Best Time:  any    Can we leave a detailed message on this number:  YES    Call taken on 10/29/2018 at 9:55 AM by Diane Lerma

## 2018-10-29 NOTE — TELEPHONE ENCOUNTER
Atenolol is used to block the effects of high thyroid hormone on the heart.  Should take it until see endocrine and they can decide whether to continue or not.

## 2018-10-30 ENCOUNTER — TELEPHONE (OUTPATIENT)
Dept: FAMILY MEDICINE | Facility: CLINIC | Age: 43
End: 2018-10-30

## 2018-10-30 NOTE — TELEPHONE ENCOUNTER
Patient advised she needs to be evaluated in the clinic. She has a specialist visit scheduled already    ANY Dodge, RN

## 2018-10-30 NOTE — TELEPHONE ENCOUNTER
Reason for call:  Patient reporting a symptom    Symptom or request: weakness and nausea    Duration (how long have symptoms been present): last week    Have you been treated for this before? Yes    Additional comments: pt calling back stating she missed two days of work due to feeling weak and tired and nauseated. She is wanting Dr Acevedo to give her something to help.    4. Fatigue, unspecified type  Vague, will check labs for anemia, liver or kidney disorder, inflammation.  Liver tests and glucose elevated, check a1c.  - Comprehensive metabolic panel  - CBC with platelets differential  - CRP inflammation  - Erythrocyte sedimentation rate auto  - Hepatitis C antibody; Future  - HIV Antigen Antibody Combo       Phone Number patient can be reached at:  Home number on file [phone] (home)    Best Time:  any    Can we leave a detailed message on this number:  YES    Call taken on 10/30/2018 at 11:12 AM by Meagan Porter

## 2018-11-05 ENCOUNTER — TRANSFERRED RECORDS (OUTPATIENT)
Dept: HEALTH INFORMATION MANAGEMENT | Facility: CLINIC | Age: 43
End: 2018-11-05

## 2018-11-07 ENCOUNTER — HOSPITAL ENCOUNTER (OUTPATIENT)
Dept: NUCLEAR MEDICINE | Facility: CLINIC | Age: 43
Setting detail: NUCLEAR MEDICINE
Discharge: HOME | End: 2018-11-07
Attending: INTERNAL MEDICINE | Admitting: INTERNAL MEDICINE
Payer: COMMERCIAL

## 2018-11-07 DIAGNOSIS — E05.80 THYROID TOXICITY, EXOGENOUS: ICD-10-CM

## 2018-11-07 PROCEDURE — 34300033 ZZH RX 343: Performed by: RADIOLOGY

## 2018-11-07 PROCEDURE — A9516 IODINE I-123 SOD IODIDE MIC: HCPCS | Performed by: RADIOLOGY

## 2018-11-07 RX ADMIN — Medication 242 UCI.: at 13:30

## 2018-11-08 ENCOUNTER — HOSPITAL ENCOUNTER (OUTPATIENT)
Dept: NUCLEAR MEDICINE | Facility: CLINIC | Age: 43
Setting detail: NUCLEAR MEDICINE
Discharge: HOME | End: 2018-11-08
Attending: INTERNAL MEDICINE | Admitting: INTERNAL MEDICINE
Payer: COMMERCIAL

## 2018-11-08 PROCEDURE — 78014 THYROID IMAGING W/BLOOD FLOW: CPT

## 2018-11-12 ENCOUNTER — TRANSFERRED RECORDS (OUTPATIENT)
Dept: HEALTH INFORMATION MANAGEMENT | Facility: CLINIC | Age: 43
End: 2018-11-12

## 2018-11-20 ENCOUNTER — ALLIED HEALTH/NURSE VISIT (OUTPATIENT)
Dept: FAMILY MEDICINE | Facility: CLINIC | Age: 43
End: 2018-11-20
Payer: COMMERCIAL

## 2018-11-20 DIAGNOSIS — R53.83 FATIGUE, UNSPECIFIED TYPE: Primary | ICD-10-CM

## 2018-11-20 PROCEDURE — 99207 ZZC NO CHARGE NURSE ONLY: CPT

## 2018-11-20 NOTE — MR AVS SNAPSHOT
After Visit Summary   11/20/2018    Soni Moody    MRN: [id number]           Patient Information     Date Of Birth          1975        Visit Information        Provider Department      11/20/2018 3:45 PM JOHANA HAY/SULLY LOGAN [facility]        Today's Diagnoses     Fatigue, unspecified type    -  1       Follow-ups after your visit        Who to contact     If you have questions or need follow up information about today's clinic visit or your schedule please contact [facility] directly at [phone].  Normal or non-critical lab and imaging results will be communicated to you by Snehtahart, letter or phone within 4 business days after the clinic has received the results. If you do not hear from us within 7 days, please contact the clinic through Ruckust or phone. If you have a critical or abnormal lab result, we will notify you by phone as soon as possible.  Submit refill requests through Cybereason or call your pharmacy and they will forward the refill request to us. Please allow 3 business days for your refill to be completed.          Additional Information About Your Visit        MyChart Information     Cybereason gives you secure access to your electronic health record. If you see a primary care provider, you can also send messages to your care team and make appointments. If you have questions, please call your primary care clinic.  If you do not have a primary care provider, please call [phone] and they will assist you.        Care EveryWhere ID     This is your Care EveryWhere ID. This could be used by other organizations to access your [place] medical records  [id number]         Blood Pressure from Last 3 Encounters:   10/22/18 90/58   02/19/18 104/68   11/16/17 100/70    Weight from Last 3 Encounters:   10/22/18 112 lb 9.6 oz (51.1 kg)   02/19/18 114 lb (51.7 kg)   11/16/17 121 lb 12.8 oz (55.2 kg)              Today, you had the following     No orders found  for display       Primary Care Provider Office Phone # Fax #    Neva Tierney Acevedo [phone] [address] [facility] [address]        Equal Access to Services     KANDI LOYD : Hadii aad ku hadbennetto Soceliaali, waalmada luqadaha, qaybta kaalmada conrad, romario gainesfelipe whiteheaditalo navarro kim serrato. So [facility] [phone].    ATENCIÓN: Si habla español, tiene a haynes disposición servicios gratuitos de asistencia lingüística. Llame al [phone].    We comply with applicable federal civil rights laws and [place] laws. We do not discriminate on the basis of race, color, national origin, age, disability, sex, sexual orientation, or gender identity.            Thank you!     Thank you for choosing [facility]  for your care. Our goal is always to provide you with excellent care. Hearing back from our patients is one way we can continue to improve our services. Please take a few minutes to complete the written survey that you may receive in the mail after your visit with us. Thank you!             Your Updated Medication List - Protect others around you: Learn how to safely use, store and throw away your medicines at www.disposemymeds.org.          This list is accurate as of 11/20/18  4:23 PM.  Always use your most recent med list.                   Brand Name Dispense Instructions for use Diagnosis    atenolol 25 MG tablet    TENORMIN    90 tablet    Take 1 tablet (25 mg) by mouth daily    Hyperthyroidism       CALCIUM 600 + D 600-200 MG-UNIT Tabs      1 qd        levonorgestrel 20 MCG/24HR IUD    MIRENA     1 each (20 mcg) by Intrauterine route continuous    Encounter for insertion of mirena IUD       NO ACTIVE MEDICATIONS

## 2018-11-20 NOTE — NURSING NOTE
Patient presents to clinic to request that Dr. Acevedo give her a new Rx for her Atenolol 25 mg that was increased by her endocrinologist to 4 times a day. Advised to would need to call them and she states she did and gave them the [facility] phone number yesterday but they never sent it over. Advised Dr. Acevedo was already gone for the day so she will check with her endocrinologist in the morning.    Torri Graham RN

## 2018-12-21 ENCOUNTER — TRANSFERRED RECORDS (OUTPATIENT)
Dept: HEALTH INFORMATION MANAGEMENT | Facility: CLINIC | Age: 43
End: 2018-12-21

## 2019-01-23 ENCOUNTER — TRANSFERRED RECORDS (OUTPATIENT)
Dept: HEALTH INFORMATION MANAGEMENT | Facility: CLINIC | Age: 44
End: 2019-01-23

## 2019-03-01 ENCOUNTER — TRANSFERRED RECORDS (OUTPATIENT)
Dept: HEALTH INFORMATION MANAGEMENT | Facility: CLINIC | Age: 44
End: 2019-03-01

## 2019-03-28 NOTE — PROGRESS NOTES
SUBJECTIVE:   CC: Soni Moody is an 43 year old woman who presents for preventive health visit.     Healthy Habits:    Do you get at least three servings of calcium containing foods daily (dairy, green leafy vegetables, etc.)? yes    Amount of exercise or daily activities, outside of work: walks a lot    Problems taking medications regularly No    Medication side effects: No    Have you had an eye exam in the past two years? yes    Do you see a dentist twice per year? no    Do you have sleep apnea, excessive snoring or daytime drowsiness?no      Chief Complaint   Patient presents with     Physical     Derm Problem     would like derm issues on her torso looked at, poossible ringworm and using over the counter treatment.         Today's PHQ-2 Score:   PHQ-2 ( 1999 Pfizer) 4/1/2019 10/22/2018   Q1: Little interest or pleasure in doing things 0 0   Q2: Feeling down, depressed or hopeless 0 0   PHQ-2 Score 0 0       Abuse: Current or Past(Physical, Sexual or Emotional)- No  Do you feel safe in your environment? Yes    Social History     Tobacco Use     Smoking status: Never Smoker     Smokeless tobacco: Never Used   Substance Use Topics     Alcohol use: No     If you drink alcohol do you typically have >3 drinks per day or >7 drinks per week? No                     Reviewed orders with patient.  Reviewed health maintenance and updated orders accordingly - Yes  BP Readings from Last 3 Encounters:   04/01/19 92/62   10/22/18 90/58   02/19/18 104/68    Wt Readings from Last 3 Encounters:   04/01/19 54.3 kg (119 lb 9.6 oz)   10/22/18 51.1 kg (112 lb 9.6 oz)   02/19/18 51.7 kg (114 lb)                  Patient Active Problem List   Diagnosis     CARDIOVASCULAR SCREENING; LDL GOAL LESS THAN 160     Titers for Hep A and Rubella shows immunity, per [facility].     Prolactinoma (H)     Dental infection     Family history of liver cancer     Other anterior pituitary disorders     mirena IUD     Past Surgical  History:   Procedure Laterality Date     HC ENLARGE BREAST WITH IMPLANT       MA BREAST BILATERAL SCREEN ANALOG NON DIGITAL  2003       Social History     Tobacco Use     Smoking status: Never Smoker     Smokeless tobacco: Never Used   Substance Use Topics     Alcohol use: No     Family History   Problem Relation Age of Onset     Cancer Mother          43yo /liver     Hypertension Father      Family History Negative Brother          Current Outpatient Medications   Medication Sig Dispense Refill     atenolol (TENORMIN) 25 MG tablet Take 1 tablet (25 mg) by mouth daily (Patient taking differently: Take 50 mg by mouth daily ) 90 tablet 1     levonorgestrel (MIRENA) 20 MCG/24HR IUD 1 each (20 mcg) by Intrauterine route continuous       methimazole (TAPAZOLE) 5 MG tablet Take 5 mg by mouth       CALCIUM 600 + D 600-200 MG-UNIT OR TABS 1 qd  0     NO ACTIVE MEDICATIONS        Allergies   Allergen Reactions     Amoxicillin Rash       Mammogram Screening: Patient under age 50, mutual decision reflected in health maintenance.      Pertinent mammograms are reviewed under the imaging tab.  History of abnormal Pap smear:   NO - age 30-65 PAP every 5 years with negative HPV co-testing recommended  Last 3 Pap Results:   PAP (no units)   Date Value   2016 NIL   2013 NIL     PAP / HPV 3/29/2016 2013   PAP NIL NIL     Reviewed and updated as needed this visit by clinical staff  Tobacco  Allergies  Meds  Med Hx  Surg Hx  Fam Hx  Soc Hx        Reviewed and updated as needed this visit by Provider        Past Medical History:   Diagnosis Date     IUD (intrauterine device) in place     paragard IUD 3/05/09     Other and unspecified anterior pituitary hyperfunction      Prolactinoma (H)     pitutary abnormally, medicine to shink prolaction      Unspecified disorder of the pituitary gland and its hypothalamic control     pituitary tumor.      Past Surgical History:   Procedure Laterality Date     HC  "ENLARGE BREAST WITH IMPLANT       MA BREAST BILATERAL SCREEN ANALOG NON DIGITAL       Obstetric History       T0      L0     SAB0   TAB1   Ectopic0   Multiple0   Live Births0       # Outcome Date GA Lbr Scott/2nd Weight Sex Delivery Anes PTL Lv   4 Para 2009           3 Para 2006           2             1 TAB                   ROS:  CONSTITUTIONAL: NEGATIVE for fever, chills, change in weight  INTEGUMENTARU/SKIN: NEGATIVE for worrisome rashes, moles or lesions  EYES: NEGATIVE for vision changes or irritation  ENT: NEGATIVE for ear, mouth and throat problems  RESP: NEGATIVE for significant cough or SOB  BREAST: NEGATIVE for masses, tenderness or discharge  CV: NEGATIVE for chest pain, palpitations or peripheral edema  GI: NEGATIVE for nausea, abdominal pain, heartburn, or change in bowel habits  : NEGATIVE for unusual urinary or vaginal symptoms. Periods are regular.  MUSCULOSKELETAL: NEGATIVE for significant arthralgias or myalgia  NEURO: NEGATIVE for weakness, dizziness or paresthesias  PSYCHIATRIC: NEGATIVE for changes in mood or affect    OBJECTIVE:   BP 92/62 (BP Location: Left arm, Patient Position: Chair, Cuff Size: Adult Regular)   Pulse 64   Temp 97.2  F (36.2  C) (Tympanic)   Resp 12   Ht 1.581 m (5' 2.25\")   Wt 54.3 kg (119 lb 9.6 oz)   SpO2 98%   BMI 21.70 kg/m    EXAM:  GENERAL: healthy, alert and no distress  EYES: Eyes grossly normal to inspection, PERRL and conjunctivae and sclerae normal  HENT: ear canals and TM's normal, nose and mouth without ulcers or lesions  NECK: no adenopathy, no asymmetry, masses, or scars and thyroid normal to palpation  RESP: lungs clear to auscultation - no rales, rhonchi or wheezes  BREAST: normal without masses, tenderness or nipple discharge and no palpable axillary masses or adenopathy  CV: regular rate and rhythm, normal S1 S2, no S3 or S4, no murmur, click or rub, no peripheral edema and peripheral pulses strong  ABDOMEN: soft, " "nontender, no hepatosplenomegaly, no masses and bowel sounds normal   (female): normal female external genitalia, normal urethral meatus , vaginal mucosa pink, moist, well rugated and normal cervix, adnexae, and uterus without masses.  MS: no gross musculoskeletal defects noted, no edema  SKIN: no suspicious lesions or rashes  NEURO: Normal strength and tone, mentation intact and speech normal  PSYCH: mentation appears normal, affect normal/bright    Diagnostic Test Results:  none     ASSESSMENT/PLAN:   1. Routine general medical examination at a health care facility  Low risk cervical cancer, low risk breast cancer.  - Pap imaged thin layer screen with HPV - recommended age 30 - 65  - HPV High Risk Types DNA Cervical    2. Prolactin increased (H)  Treated and resolved many years ago, sees endocrinology and following    3. Prolactinoma (H)  as listed above    4. Tinea corporis  Immunocompromised?  - fluconazole (DIFLUCAN) 100 MG tablet; Take 1 tablet (100 mg) by mouth twice a week for 15 days  Dispense: 4 tablet; Refill: 0    COUNSELING:   Reviewed preventive health counseling, as reflected in patient instructions    BP Readings from Last 1 Encounters:   04/01/19 92/62     Estimated body mass index is 21.7 kg/m  as calculated from the following:    Height as of this encounter: 1.581 m (5' 2.25\").    Weight as of this encounter: 54.3 kg (119 lb 9.6 oz).      Weight management plan noted, stable and monitoring     reports that  has never smoked. she has never used smokeless tobacco.      Counseling Resources:  ATP IV Guidelines  Pooled Cohorts Equation Calculator  Breast Cancer Risk Calculator  FRAX Risk Assessment  ICSI Preventive Guidelines  Dietary Guidelines for Americans, 2010  USDA's MyPlate  ASA Prophylaxis  Lung CA Screening    Neva Acevedo MD  [facility]  "

## 2019-04-01 ENCOUNTER — RESULT FOLLOW UP (OUTPATIENT)
Dept: FAMILY MEDICINE | Facility: CLINIC | Age: 44
End: 2019-04-01

## 2019-04-01 ENCOUNTER — OFFICE VISIT (OUTPATIENT)
Dept: FAMILY MEDICINE | Facility: CLINIC | Age: 44
End: 2019-04-01
Payer: COMMERCIAL

## 2019-04-01 ENCOUNTER — TRANSFERRED RECORDS (OUTPATIENT)
Dept: HEALTH INFORMATION MANAGEMENT | Facility: CLINIC | Age: 44
End: 2019-04-01

## 2019-04-01 VITALS
BODY MASS INDEX: 22.01 KG/M2 | SYSTOLIC BLOOD PRESSURE: 92 MMHG | OXYGEN SATURATION: 98 % | TEMPERATURE: 97.2 F | DIASTOLIC BLOOD PRESSURE: 62 MMHG | RESPIRATION RATE: 12 BRPM | HEIGHT: 62 IN | WEIGHT: 119.6 LBS | HEART RATE: 64 BPM

## 2019-04-01 DIAGNOSIS — R87.810 CERVICAL HIGH RISK HPV (HUMAN PAPILLOMAVIRUS) TEST POSITIVE: ICD-10-CM

## 2019-04-01 DIAGNOSIS — D35.2 PROLACTINOMA (H): ICD-10-CM

## 2019-04-01 DIAGNOSIS — B35.4 TINEA CORPORIS: ICD-10-CM

## 2019-04-01 DIAGNOSIS — R79.89 PROLACTIN INCREASED: ICD-10-CM

## 2019-04-01 DIAGNOSIS — Z00.00 ROUTINE GENERAL MEDICAL EXAMINATION AT A HEALTH CARE FACILITY: Primary | ICD-10-CM

## 2019-04-01 PROCEDURE — 99396 PREV VISIT EST AGE 40-64: CPT | Performed by: FAMILY MEDICINE

## 2019-04-01 PROCEDURE — G0145 SCR C/V CYTO,THINLAYER,RESCR: HCPCS | Performed by: FAMILY MEDICINE

## 2019-04-01 PROCEDURE — 87624 HPV HI-RISK TYP POOLED RSLT: CPT | Performed by: FAMILY MEDICINE

## 2019-04-01 RX ORDER — FLUCONAZOLE 100 MG/1
100 TABLET ORAL
Qty: 4 TABLET | Refills: 0 | Status: SHIPPED | OUTPATIENT
Start: 2019-04-01 | End: 2019-05-03

## 2019-04-01 RX ORDER — METHIMAZOLE 5 MG/1
2.5 TABLET ORAL
COMMUNITY
End: 2021-05-24

## 2019-04-01 ASSESSMENT — MIFFLIN-ST. JEOR: SCORE: 1164.72

## 2019-04-01 NOTE — NURSING NOTE
"Initial BP 92/62 (BP Location: Left arm, Patient Position: Chair, Cuff Size: Adult Regular)   Pulse 64   Temp 97.2  F (36.2  C) (Tympanic)   Resp 12   Ht 1.581 m (5' 2.25\")   Wt 54.3 kg (119 lb 9.6 oz)   SpO2 98%   BMI 21.70 kg/m   Estimated body mass index is 21.7 kg/m  as calculated from the following:    Height as of this encounter: 1.581 m (5' 2.25\").    Weight as of this encounter: 54.3 kg (119 lb 9.6 oz). .    Della Brown, AMINTA    "

## 2019-04-04 LAB
COPATH REPORT: NORMAL
PAP: NORMAL

## 2019-04-05 LAB
FINAL DIAGNOSIS: ABNORMAL
HPV HR 12 DNA CVX QL NAA+PROBE: POSITIVE
HPV16 DNA SPEC QL NAA+PROBE: NEGATIVE
HPV18 DNA SPEC QL NAA+PROBE: NEGATIVE
SPECIMEN DESCRIPTION: ABNORMAL
SPECIMEN SOURCE CVX/VAG CYTO: ABNORMAL

## 2019-04-08 NOTE — PROGRESS NOTES
2005, 2008, 2011 NIL paps. (Found in Care Everywhere).  2013 and 2016 NIL paps. (Found in EPIC).  4/1/19 NIL pap, + HR HPV (not 16 or 18). Plan cotest in 1 year.([facility])  4/8/19 patient notified by phone. ([place]) CCT Tracking.

## 2019-04-24 DIAGNOSIS — B35.4 TINEA CORPORIS: ICD-10-CM

## 2019-04-24 NOTE — TELEPHONE ENCOUNTER
Routing refill request to provider for review/approval because:  Associated Diagnoses   Tinea corporis [B35.4]       4. Tinea corporis  Immunocompromised?  - fluconazole (DIFLUCAN) 100 MG tablet; Take 1 tablet (100 mg) by mouth twice a week for 15 days  Dispense: 4 tablet; Refill: 0      OK for further refills?     Valerie DAVIS BSN, RN

## 2019-04-29 RX ORDER — FLUCONAZOLE 100 MG/1
100 TABLET ORAL
Qty: 4 TABLET | Refills: 0 | OUTPATIENT
Start: 2019-04-29

## 2019-04-30 NOTE — TELEPHONE ENCOUNTER
Refused Medication Requests         Fluconazole 100 mg Oral TWICE WEEKLY       Protocol Details     Tried to call to tell her, and got voicemail.   Left message on answering machine for patient to call back. CSS, when she calls back, please advise her Dr Acevedo declined to refill this without a visit, and we need to see her please. Thanks,     Elina Bragg RNC

## 2019-05-01 NOTE — TELEPHONE ENCOUNTER
Advised needs appt.     Pt has been using this to treat ringworm.  Pt says her symptoms are better but not resolved and some new lesions have developed too.  She asks if Dr Acevedo is willing to work her into her schedule on Monday, 5/6/19?  Last seen 4/1/19.    Routed to Dr Acevedo.    Ayse Hernandez RN

## 2019-05-03 RX ORDER — FLUCONAZOLE 100 MG/1
100 TABLET ORAL
Qty: 10 TABLET | Refills: 0 | Status: SHIPPED | OUTPATIENT
Start: 2019-05-06 | End: 2021-05-24

## 2019-05-03 NOTE — TELEPHONE ENCOUNTER
CSS-ok to let patient know medication was refilled and sent to pharmacy. Follow up if problems persist.    Left message for patient to return call to clinic.   Valerie Alvarenga, NELYN, RN

## 2019-05-06 NOTE — PROGRESS NOTES
SUBJECTIVE:                                                    Soni Moody is 43 year old female   Chief Complaint   Patient presents with     Derm Problem     Ring worm was discussed at last visit 19. States that she is on her second round of Diflucan, not helping symptoms as much as expected. States not taking Atenenol due to possibly effdecting Ring worm     Allergies     States that she experianced a very itchy rash on abd and back after starting B12. Discontinued Vitamin B12 (2 weeks ago), rash continues.         Problem list and histories reviewed & adjusted, as indicated.  Additional history: as documented    Patient Active Problem List   Diagnosis     CARDIOVASCULAR SCREENING; LDL GOAL LESS THAN 160     Titers for Hep A and Rubella shows immunity, per [facility] records.     Prolactinoma (H)     Dental infection     Family history of liver cancer     Other anterior pituitary disorders     mirena IUD     Cervical high risk HPV (human papillomavirus) test positive     Past Surgical History:   Procedure Laterality Date     HC ENLARGE BREAST WITH IMPLANT       MA BREAST BILATERAL SCREEN ANALOG NON DIGITAL         Social History     Tobacco Use     Smoking status: Never Smoker     Smokeless tobacco: Never Used   Substance Use Topics     Alcohol use: No     Family History   Problem Relation Age of Onset     Cancer Mother          45yo /liver     Hypertension Father      Family History Negative Brother          Current Outpatient Medications   Medication Sig Dispense Refill     fluconazole (DIFLUCAN) 100 MG tablet Take 1 tablet (100 mg) by mouth twice a week 10 tablet 0     levonorgestrel (MIRENA) 20 MCG/24HR IUD 1 each (20 mcg) by Intrauterine route continuous       methimazole (TAPAZOLE) 5 MG tablet Take 2.5 mg by mouth        atenolol (TENORMIN) 25 MG tablet Take 1 tablet (25 mg) by mouth daily (Patient not taking: Reported on 2019) 90 tablet 1     CALCIUM 600 + D 600-200  "MG-UNIT OR TABS 1 qd  0     NO ACTIVE MEDICATIONS        Allergies   Allergen Reactions     Amoxicillin Rash     Recent Labs   Lab Test 10/23/18  1448 10/22/18  0905 05/08/17  0711  06/01/16  1024 10/06/14  0834 05/09/13  0818 10/14/11   A1C 5.2  --   --   --   --   --   --   --    LDL  --   --   --   --   --   --  95  --    HDL  --   --   --   --   --   --  53 58   TRIG  --   --   --   --   --   --  92  --    ALT  --  125*  --   --  24 26  --   --    CR  --  0.41*  --   --  0.62 0.63  --   --    GFRESTIMATED  --  >90  --   --  >90  Non  GFR Calc   >90  Non  GFR Calc    --   --    GFRESTBLACK  --  >90  --   --  >90   GFR Calc   >90   GFR Calc    --   --    POTASSIUM  --  4.4  --   --  4.2 4.2  --   --    TSH  --  <0.01* 0.53   < > 1.81  --   --   --     < > = values in this interval not displayed.      BP Readings from Last 3 Encounters:   05/07/19 102/64   04/01/19 92/62   10/22/18 90/58    Wt Readings from Last 3 Encounters:   05/07/19 54.3 kg (119 lb 12.8 oz)   04/01/19 54.3 kg (119 lb 9.6 oz)   10/22/18 51.1 kg (112 lb 9.6 oz)         ROS:  Constitutional, HEENT, cardiovascular, pulmonary, gi and gu systems are negative, except as otherwise noted.    OBJECTIVE:                                                    /64   Pulse 71   Temp 97.9  F (36.6  C) (Tympanic)   Resp 12   Ht 1.581 m (5' 2.25\")   Wt 54.3 kg (119 lb 12.8 oz)   SpO2 99%   BMI 21.74 kg/m    GENERAL APPEARANCE ADULT: Alert, no acute distress  SKIN: rash present, type:macular, patch, distinct margins, thickened, appearance: variegated, location: chest, abdomen and back  Diagnostic Test Results:  none      ASSESSMENT/PLAN:                                                    1. Adverse effect of drug, initial encounter  Itchy rash, will stop all medications except tapazole and use benadryl and cerave cream for itching  - Emollient (CERAVE) CREA; Externally apply topically 3 " times daily  Dispense: 1 Bottle; Refill: 11  - diphenhydrAMINE (BENADRYL) 25 MG tablet; Take 1 tablet (25 mg) by mouth every 6 hours as needed for itching or allergies  Dispense: 60 tablet; Refill: 1    Neva Acevedo MD  [facility]

## 2019-05-07 ENCOUNTER — OFFICE VISIT (OUTPATIENT)
Dept: FAMILY MEDICINE | Facility: CLINIC | Age: 44
End: 2019-05-07
Payer: COMMERCIAL

## 2019-05-07 VITALS
BODY MASS INDEX: 22.05 KG/M2 | WEIGHT: 119.8 LBS | RESPIRATION RATE: 12 BRPM | OXYGEN SATURATION: 99 % | TEMPERATURE: 97.9 F | HEART RATE: 71 BPM | SYSTOLIC BLOOD PRESSURE: 102 MMHG | HEIGHT: 62 IN | DIASTOLIC BLOOD PRESSURE: 64 MMHG

## 2019-05-07 DIAGNOSIS — T50.905A ADVERSE EFFECT OF DRUG, INITIAL ENCOUNTER: Primary | ICD-10-CM

## 2019-05-07 PROCEDURE — 99213 OFFICE O/P EST LOW 20 MIN: CPT | Performed by: FAMILY MEDICINE

## 2019-05-07 RX ORDER — DIPHENHYDRAMINE HCL 25 MG
25 TABLET ORAL EVERY 6 HOURS PRN
Qty: 60 TABLET | Refills: 1 | Status: SHIPPED | OUTPATIENT
Start: 2019-05-07 | End: 2021-12-02

## 2019-05-07 ASSESSMENT — MIFFLIN-ST. JEOR: SCORE: 1165.63

## 2019-05-07 NOTE — NURSING NOTE
"Initial /64   Pulse 71   Temp 97.9  F (36.6  C) (Tympanic)   Resp 12   Ht 1.581 m (5' 2.25\")   Wt 54.3 kg (119 lb 12.8 oz)   SpO2 99%   BMI 21.74 kg/m   Estimated body mass index is 21.74 kg/m  as calculated from the following:    Height as of this encounter: 1.581 m (5' 2.25\").    Weight as of this encounter: 54.3 kg (119 lb 12.8 oz). .      "

## 2019-08-16 ENCOUNTER — TRANSFERRED RECORDS (OUTPATIENT)
Dept: HEALTH INFORMATION MANAGEMENT | Facility: CLINIC | Age: 44
End: 2019-08-16

## 2019-11-18 ENCOUNTER — TRANSFERRED RECORDS (OUTPATIENT)
Dept: HEALTH INFORMATION MANAGEMENT | Facility: CLINIC | Age: 44
End: 2019-11-18

## 2020-03-02 ENCOUNTER — HEALTH MAINTENANCE LETTER (OUTPATIENT)
Age: 45
End: 2020-03-02

## 2020-07-06 ENCOUNTER — TRANSFERRED RECORDS (OUTPATIENT)
Dept: HEALTH INFORMATION MANAGEMENT | Facility: CLINIC | Age: 45
End: 2020-07-06

## 2020-12-20 ENCOUNTER — HEALTH MAINTENANCE LETTER (OUTPATIENT)
Age: 45
End: 2020-12-20

## 2021-01-29 ENCOUNTER — PATIENT OUTREACH (OUTPATIENT)
Dept: FAMILY MEDICINE | Facility: CLINIC | Age: 46
End: 2021-01-29

## 2021-01-29 DIAGNOSIS — R87.810 CERVICAL HIGH RISK HPV (HUMAN PAPILLOMAVIRUS) TEST POSITIVE: ICD-10-CM

## 2021-03-30 NOTE — TELEPHONE ENCOUNTER
FYI to provider - Patient is lost to pap tracking follow-up. Attempts to contact pt have been made per reminder process and there has been no reply and/or no appt scheduled.       2005, 2008, 2011 NIL paps. (Found in Care Everywhere).  2013 and 2016 NIL paps. (Found in EPIC).  4/1/19 NIL pap, + HR HPV (not 16 or 18). Plan cotest in 1 year.([facility])  4/8/19 patient notified by phone. ([facility]   1/29/21 Reminder Mychart - not read  3/1/21 Reminder call - lm  3/30/21 Lost to follow up

## 2021-05-23 ASSESSMENT — ENCOUNTER SYMPTOMS
JOINT SWELLING: 0
WEAKNESS: 0
HEMATOCHEZIA: 0
HEMATURIA: 0
CHILLS: 0
ABDOMINAL PAIN: 0
SORE THROAT: 0
HEARTBURN: 0
ARTHRALGIAS: 0
MYALGIAS: 0
PARESTHESIAS: 0
FREQUENCY: 0
BREAST MASS: 0
SHORTNESS OF BREATH: 1
FEVER: 0
DYSURIA: 0
DIARRHEA: 0
PALPITATIONS: 0
NAUSEA: 0
HEADACHES: 0
NERVOUS/ANXIOUS: 0
DIZZINESS: 0
COUGH: 0
CONSTIPATION: 0
EYE PAIN: 0

## 2021-05-24 ENCOUNTER — OFFICE VISIT (OUTPATIENT)
Dept: FAMILY MEDICINE | Facility: CLINIC | Age: 46
End: 2021-05-24
Payer: COMMERCIAL

## 2021-05-24 VITALS
TEMPERATURE: 98.4 F | SYSTOLIC BLOOD PRESSURE: 94 MMHG | OXYGEN SATURATION: 99 % | RESPIRATION RATE: 16 BRPM | DIASTOLIC BLOOD PRESSURE: 54 MMHG | WEIGHT: 121.4 LBS | HEART RATE: 68 BPM | HEIGHT: 62 IN | BODY MASS INDEX: 22.34 KG/M2

## 2021-05-24 DIAGNOSIS — Z12.4 CERVICAL CANCER SCREENING: ICD-10-CM

## 2021-05-24 DIAGNOSIS — R87.810 CERVICAL HIGH RISK HPV (HUMAN PAPILLOMAVIRUS) TEST POSITIVE: ICD-10-CM

## 2021-05-24 DIAGNOSIS — E05.00 GRAVES DISEASE: ICD-10-CM

## 2021-05-24 DIAGNOSIS — Z01.419 ENCOUNTER FOR GYNECOLOGICAL EXAMINATION WITHOUT ABNORMAL FINDING: Primary | ICD-10-CM

## 2021-05-24 LAB
CHOLEST SERPL-MCNC: 194 MG/DL
GLUCOSE SERPL-MCNC: 88 MG/DL (ref 70–99)
HDLC SERPL-MCNC: 66 MG/DL
LDLC SERPL CALC-MCNC: 105 MG/DL
NONHDLC SERPL-MCNC: 128 MG/DL
T3FREE SERPL-MCNC: 2.6 PG/ML (ref 2.3–4.2)
T4 FREE SERPL-MCNC: 1.03 NG/DL (ref 0.76–1.46)
TRIGL SERPL-MCNC: 113 MG/DL
TSH SERPL DL<=0.005 MIU/L-ACNC: 0.95 MU/L (ref 0.4–4)

## 2021-05-24 PROCEDURE — 82947 ASSAY GLUCOSE BLOOD QUANT: CPT | Performed by: FAMILY MEDICINE

## 2021-05-24 PROCEDURE — 99213 OFFICE O/P EST LOW 20 MIN: CPT | Mod: 25 | Performed by: FAMILY MEDICINE

## 2021-05-24 PROCEDURE — 84439 ASSAY OF FREE THYROXINE: CPT | Performed by: FAMILY MEDICINE

## 2021-05-24 PROCEDURE — 99396 PREV VISIT EST AGE 40-64: CPT | Performed by: FAMILY MEDICINE

## 2021-05-24 PROCEDURE — 84481 FREE ASSAY (FT-3): CPT | Performed by: FAMILY MEDICINE

## 2021-05-24 PROCEDURE — G0145 SCR C/V CYTO,THINLAYER,RESCR: HCPCS | Performed by: FAMILY MEDICINE

## 2021-05-24 PROCEDURE — 84443 ASSAY THYROID STIM HORMONE: CPT | Performed by: FAMILY MEDICINE

## 2021-05-24 PROCEDURE — 36415 COLL VENOUS BLD VENIPUNCTURE: CPT | Performed by: FAMILY MEDICINE

## 2021-05-24 PROCEDURE — 87624 HPV HI-RISK TYP POOLED RSLT: CPT | Performed by: FAMILY MEDICINE

## 2021-05-24 PROCEDURE — 80061 LIPID PANEL: CPT | Performed by: FAMILY MEDICINE

## 2021-05-24 ASSESSMENT — ENCOUNTER SYMPTOMS
FEVER: 0
NAUSEA: 0
ARTHRALGIAS: 0
PARESTHESIAS: 0
DIZZINESS: 0
WEAKNESS: 0
PALPITATIONS: 0
SORE THROAT: 0
ABDOMINAL PAIN: 0
DIARRHEA: 0
HEADACHES: 0
HEMATURIA: 0
HEMATOCHEZIA: 0
SHORTNESS OF BREATH: 1
FREQUENCY: 0
JOINT SWELLING: 0
CONSTIPATION: 0
BREAST MASS: 0
DYSURIA: 0
EYE PAIN: 0
NERVOUS/ANXIOUS: 0
HEARTBURN: 0
MYALGIAS: 0
CHILLS: 0
COUGH: 0

## 2021-05-24 ASSESSMENT — MIFFLIN-ST. JEOR: SCORE: 1165.3

## 2021-05-24 NOTE — PATIENT INSTRUCTIONS
1. To lab      Preventive Health Recommendations  Female Ages 40 to 49    Yearly exam:     See your health care provider every year in order to  1. Review health changes.   2. Discuss preventive care.    3. Review your medicines if your doctor prescribed any.      Get a Pap test every three years (unless you have an abnormal result and your provider advises testing more often).      If you get Pap tests with HPV test, you only need to test every 5 years, unless you have an abnormal result. You do not need a Pap test if your uterus was removed (hysterectomy) and you have not had cancer.      You should be tested each year for STDs (sexually transmitted diseases), if you're at risk.     Ask your doctor if you should have a mammogram.      Have a colonoscopy (test for colon cancer) if someone in your family has had colon cancer or polyps before age 50.       Have a cholesterol test every 5 years.       Have a diabetes test (fasting glucose) after age 45. If you are at risk for diabetes, you should have this test every 3 years.    Shots: Get a flu shot each year. Get a tetanus shot every 10 years.     Nutrition:     Eat at least 5 servings of fruits and vegetables each day.    Eat whole-grain bread, whole-wheat pasta and brown rice instead of white grains and rice.    Get adequate Calcium and Vitamin D.      Lifestyle    Exercise at least 150 minutes a week (an average of 30 minutes a day, 5 days a week). This will help you control your weight and prevent disease.    Limit alcohol to one drink per day.    No smoking.     Wear sunscreen to prevent skin cancer.    See your dentist every six months for an exam and cleaning.    Thank you for your questions about the COVID Vaccine!  It is very important that when your time comes that you should be ready and get your vaccine.    Please visit our webpage for the most up to date information on vaccine availability.  [email or web address]    Why  should I get a COVID-19 vaccine?  While we have made some progress in the fight against COVID-19 with public health measures like masking and social distancing, widespread vaccination is the only way that we can stop the pandemic. Not only does getting the vaccine protect you against COVID-19, it also reduces the chances that you will spread it to others, including your family and friends    Can I get COVID-19 from the vaccine? Does it use a live virus?  No, it is not possible to get COVID-19 from the vaccine. The Pfizer and Moderna vaccines use only genetic material from the virus while other vaccines being studied use inactivated versions of the virus. None of these can cause COVID-19.     Is it safe to receive a COVID-19 vaccine? How effective are they?  Two COVID-19 vaccines produced by Pfizer/BioNTech and Moderna are both safe and more than 94 percent effective, according to the results of two large clinical trials. Both the Pfizer/BioNTech and Moderna vaccines have already received U.S. Food and Drug Administration (FDA) emergency use authorization, allowing them to be used in the [place].    How do the Pfizer/BioNTech and Moderna vaccines work and how many doses do I need?  The Pfizer/BioNTech and Moderna vaccines do not use live or weakened versions of the coronavirus causing COVID-19. Instead, these vaccines have genetic material called mRNA or  messenger RNA  that is taken from the virus. Both vaccines come in two doses. Once you receive both doses of the vaccine, it will likely take several weeks for your body to develop immunity.    What are the side effects?  During the clinical trials, only mild to moderate flu-like side effects which are part of the immune response were reported, including a headache, fatigue, chills, fever, and muscle and joint soreness. Side effects are more likely to occur after the second dose. Most of these symptoms ended three days after the vaccine, or earlier.     If I  had COVID-19 should I get the vaccine?  Yes. People who have tested positive for COVID-19 produce antibodies that offer some protection against the virus, but we don t know enough yet about antibody protection and how long it may last, so we recommend that everyone get the vaccine.      Do I have to continue wearing a mask after I get the vaccine?  Yes. Everyone should wear face masks, wash their hands frequently, practice social distancing, and take other safety steps until more people have received the vaccine, the number of COVID-19 cases nationwide is no longer at pandemic levels, and we understand more about how long these vaccines will protect us.

## 2021-05-24 NOTE — PROGRESS NOTES
SUBJECTIVE:   CC: Soni Moody is an 45 year old woman who presents for preventive health visit.     Patient has been advised of split billing requirements and indicates understanding: Yes  Healthy Habits:     Getting at least 3 servings of Calcium per day:  Yes    Bi-annual eye exam:  Yes    Dental care twice a year:  Yes    Sleep apnea or symptoms of sleep apnea:  None    Diet:  Regular (no restrictions)    Frequency of exercise:  1 day/week    Duration of exercise:  Greater than 60 minutes    Taking medications regularly:  Yes    Medication side effects:  None    PHQ-2 Total Score: 0    Additional concerns today:  No    Grave's disease on tapazole for 2 years.  Hypothyroidism Follow-up      Since last visit, patient describes the following symptoms: Weight stable, no hair loss, no skin changes, no constipation, no loose stools      Today's PHQ-2 Score:   PHQ-2 ( 1999 Pfizer) 5/23/2021   Q1: Little interest or pleasure in doing things 0   Q2: Feeling down, depressed or hopeless 0   PHQ-2 Score 0   Q1: Little interest or pleasure in doing things Not at all   Q2: Feeling down, depressed or hopeless Not at all   PHQ-2 Score 0       Abuse: Current or Past (Physical, Sexual or Emotional) - No  Do you feel safe in your environment? Yes    Have you ever done Advance Care Planning? (For example, a Health Directive, POLST, or a discussion with a medical provider or your loved ones about your wishes): No, advance care planning information given to patient to review.  Patient declined advance care planning discussion at this time.    Social History     Tobacco Use     Smoking status: Never Smoker     Smokeless tobacco: Never Used   Substance Use Topics     Alcohol use: No     If you drink alcohol do you typically have >3 drinks per day or >7 drinks per week? Not applicable    Alcohol Use 5/23/2021   Prescreen: >3 drinks/day or >7 drinks/week? No   Prescreen: >3 drinks/day or >7 drinks/week? -   No flowsheet data  found.    Reviewed orders with patient.  Reviewed health maintenance and updated orders accordingly - Yes  BP Readings from Last 3 Encounters:   05/24/21 94/54   05/07/19 102/64   04/01/19 92/62    Wt Readings from Last 3 Encounters:   05/24/21 55.1 kg (121 lb 6.4 oz)   05/07/19 54.3 kg (119 lb 12.8 oz)   04/01/19 54.3 kg (119 lb 9.6 oz)                    Breast Cancer Screening:    Breast CA Risk Assessment (FHS-7) 5/23/2021   Do you have a family history of breast, colon, or ovarian cancer? No / Unknown         Mammogram Screening - Offered annual screening and updated Health Maintenance for mutual plan based on risk factor consideration    Pertinent mammograms are reviewed under the imaging tab.    History of abnormal Pap smear: YES - other categories - see link Cervical Cytology Screening Guidelines positive HR hPV 4/1/19, cotest one year.  PAP / HPV Latest Ref Rng & Units 4/1/2019 3/29/2016 4/30/2013   PAP - NIL NIL NIL   HPV 16 DNA NEG:Negative Negative - -   HPV 18 DNA NEG:Negative Negative - -   OTHER HR HPV NEG:Negative Positive(A) - -     Reviewed and updated as needed this visit by clinical staff  Tobacco  Allergies  Meds   Med Hx  Surg Hx  Fam Hx  Soc Hx        Reviewed and updated as needed this visit by Provider                    Review of Systems   Constitutional: Negative for chills and fever.   HENT: Positive for hearing loss. Negative for congestion, ear pain and sore throat.    Eyes: Negative for pain and visual disturbance.   Respiratory: Positive for shortness of breath. Negative for cough.    Cardiovascular: Negative for chest pain, palpitations and peripheral edema.   Gastrointestinal: Negative for abdominal pain, constipation, diarrhea, heartburn, hematochezia and nausea.   Breasts:  Negative for tenderness, breast mass and discharge.   Genitourinary: Negative for dysuria, frequency, genital sores, hematuria, pelvic pain, urgency, vaginal bleeding and vaginal discharge.  "  Musculoskeletal: Negative for arthralgias, joint swelling and myalgias.   Skin: Positive for rash.   Neurological: Negative for dizziness, weakness, headaches and paresthesias.   Psychiatric/Behavioral: Negative for mood changes. The patient is not nervous/anxious.    no periods with Mirena IUD       OBJECTIVE:   BP 94/54   Pulse 68   Temp 98.4  F (36.9  C) (Tympanic)   Resp 16   Ht 1.585 m (5' 2.4\")   Wt 55.1 kg (121 lb 6.4 oz)   SpO2 99%   BMI 21.92 kg/m    Physical Exam  GENERAL: healthy, alert and no distress  EYES: Eyes grossly normal to inspection, PERRL and conjunctivae and sclerae normal  HENT: ear canals and TM's normal, nose and mouth without ulcers or lesions  NECK: no adenopathy, no asymmetry, masses, or scars and thyroid normal to palpation  RESP: lungs clear to auscultation - no rales, rhonchi or wheezes  BREAST: normal without masses, tenderness or nipple discharge and no palpable axillary masses or adenopathy  CV: regular rate and rhythm, normal S1 S2, no S3 or S4, no murmur, click or rub, no peripheral edema and peripheral pulses strong  ABDOMEN: soft, nontender, no hepatosplenomegaly, no masses and bowel sounds normal   (female): normal female external genitalia, normal urethral meatus, vaginal mucosa pink, moist, well rugated, and normal cervix/adnexa/uterus without masses or discharge  MS: no gross musculoskeletal defects noted, no edema  SKIN: no suspicious lesions or rashes  NEURO: Normal strength and tone, mentation intact and speech normal  PSYCH: mentation appears normal, affect normal/bright        ASSESSMENT/PLAN:   Soni was seen today for physical.    Diagnoses and all orders for this visit:    Encounter for gynecological examination without abnormal finding  -     Lipid panel reflex to direct LDL Fasting  -     Glucose    Cervical high risk HPV (human papillomavirus) test positive  -     HPV High Risk Types DNA Cervical  -     Pap imaged thin layer screen with HPV - " "recommended age 30 - 65 years (select HPV order below)    Cervical cancer screening  -     HPV High Risk Types DNA Cervical  -     Pap imaged thin layer screen with HPV - recommended age 30 - 65 years (select HPV order below)    Graves disease: completed 2 years of tapazole and endo reitred, he recommended for two year then stop and   recheck, has stopped medication, due for recheck.  -     TSH  -     T4 FREE  -     T3, Free        Patient has been advised of split billing requirements and indicates understanding: Yes  COUNSELING:  Reviewed preventive health counseling, as reflected in patient instructions       Regular exercise       Healthy diet/nutrition       Vision screening       Contraception       Consider Hep C screening for all patients one time for ages 18-79 years       HIV screeninx in teen years, 1x in adult years, and at intervals if high risk    Estimated body mass index is 21.92 kg/m  as calculated from the following:    Height as of this encounter: 1.585 m (5' 2.4\").    Weight as of this encounter: 55.1 kg (121 lb 6.4 oz).        She reports that she has never smoked. She has never used smokeless tobacco.      Counseling Resources:  ATP IV Guidelines  Pooled Cohorts Equation Calculator  Breast Cancer Risk Calculator  BRCA-Related Cancer Risk Assessment: FHS-7 Tool  FRAX Risk Assessment  ICSI Preventive Guidelines  Dietary Guidelines for Americans,   [employer]s MyPlate  ASA Prophylaxis  Lung CA Screening    Tuan Dunne MD  [facility]  "

## 2021-05-26 LAB
COPATH REPORT: NORMAL
PAP: NORMAL

## 2021-05-28 LAB
FINAL DIAGNOSIS: NORMAL
HPV HR 12 DNA CVX QL NAA+PROBE: NEGATIVE
HPV16 DNA SPEC QL NAA+PROBE: NEGATIVE
HPV18 DNA SPEC QL NAA+PROBE: NEGATIVE
SPECIMEN DESCRIPTION: NORMAL
SPECIMEN SOURCE CVX/VAG CYTO: NORMAL

## 2021-06-01 ENCOUNTER — PATIENT OUTREACH (OUTPATIENT)
Dept: FAMILY MEDICINE | Facility: CLINIC | Age: 46
End: 2021-06-01
Payer: COMMERCIAL

## 2021-10-03 ENCOUNTER — HEALTH MAINTENANCE LETTER (OUTPATIENT)
Age: 46
End: 2021-10-03

## 2021-12-02 ENCOUNTER — OFFICE VISIT (OUTPATIENT)
Dept: FAMILY MEDICINE | Facility: CLINIC | Age: 46
End: 2021-12-02
Payer: COMMERCIAL

## 2021-12-02 VITALS
BODY MASS INDEX: 23 KG/M2 | TEMPERATURE: 98.1 F | OXYGEN SATURATION: 99 % | SYSTOLIC BLOOD PRESSURE: 88 MMHG | HEART RATE: 71 BPM | WEIGHT: 125 LBS | DIASTOLIC BLOOD PRESSURE: 60 MMHG | HEIGHT: 62 IN

## 2021-12-02 DIAGNOSIS — E05.00 GRAVES' DISEASE: Primary | ICD-10-CM

## 2021-12-02 DIAGNOSIS — D35.2 PROLACTINOMA (H): ICD-10-CM

## 2021-12-02 DIAGNOSIS — E22.9 HYPERFUNCTION OF PITUITARY GLAND, UNSPECIFIED (H): ICD-10-CM

## 2021-12-02 PROCEDURE — 99213 OFFICE O/P EST LOW 20 MIN: CPT | Performed by: NURSE PRACTITIONER

## 2021-12-02 ASSESSMENT — MIFFLIN-ST. JEOR: SCORE: 1170.25

## 2021-12-02 NOTE — PROGRESS NOTES
Assessment & Plan     Graves' disease  - Adult Endocrinology Referral; Future    Hyperfunction of pituitary gland, unspecified (H)  - Adult Endocrinology Referral; Future    Prolactinoma (H)  - Adult Endocrinology Referral; Future    Offered influenza and COVID vaccine - she declined.      The risks, benefits and treatment options of prescribed medications or other treatments have been discussed with the patient. The patient verbalized their understanding and should call or follow up if no improvement or if they develop further problems.    MOISES Serrano [facility] TRACEY Shafer is a 46 year old who presents for the following health issues     HPI     Chief Complaint   Patient presents with     Patient Request     patient asking for referral to endocrinologist.  her previous Dr retired;  she tried to make an appointment herself and was told she needs to be referred.   patient c/o fatigue.  Is not currently on thyroid medication, but states she has been in the past.  She brought medical records with her today      Patient has a history of thyrotoxicosis secondary to Graves' disease.  She completed 2 years of Tapazole.  She is currently not on any medication.  She had a TSH, T4 and T3 checked in May which were normal.  She denies any thyroid symptoms.  Her endocrinologist retired and she is looking for a new referral.      Patient also has a history of a pituitary abnormality since age 23 or 24.  Presenting symptom at the time of diagnosis with breast discharge.  She was evaluated at the [facility] and found to have a pituitary microadenoma, presumably prolactin secreting.  She was treated with bromocriptine.  She states she has not been on this medication since around 2004.  Per notes from her endocrinologist, there has been no reoccurrence of the hyperprolactinemia.          Review of Systems   Constitutional, HEENT, cardiovascular, pulmonary, gi and gu systems  "are negative, except as otherwise noted.      Objective    BP (!) 88/60 (BP Location: Right arm, Cuff Size: Adult Regular)   Pulse 71   Temp 98.1  F (36.7  C) (Tympanic)   Ht 1.575 m (5' 2\")   Wt 56.7 kg (125 lb)   SpO2 99%   BMI 22.86 kg/m    Body mass index is 22.86 kg/m .  Physical Exam   GENERAL: healthy, alert and no distress  RESP: lungs clear to auscultation - no rales, rhonchi or wheezes  CV: regular rate and rhythm, normal S1 S2, no S3 or S4, no murmur, click or rub, no peripheral edema and peripheral pulses strong  PSYCH: mentation appears normal, affect normal/bright                "

## 2022-01-17 ENCOUNTER — OFFICE VISIT (OUTPATIENT)
Dept: ENDOCRINOLOGY | Facility: CLINIC | Age: 47
End: 2022-01-17
Attending: NURSE PRACTITIONER
Payer: COMMERCIAL

## 2022-01-17 VITALS
RESPIRATION RATE: 16 BRPM | DIASTOLIC BLOOD PRESSURE: 58 MMHG | SYSTOLIC BLOOD PRESSURE: 90 MMHG | BODY MASS INDEX: 23 KG/M2 | HEART RATE: 75 BPM | WEIGHT: 125 LBS | HEIGHT: 62 IN

## 2022-01-17 DIAGNOSIS — E05.00 GRAVES' DISEASE: ICD-10-CM

## 2022-01-17 DIAGNOSIS — E22.9 HYPERFUNCTION OF PITUITARY GLAND, UNSPECIFIED (H): ICD-10-CM

## 2022-01-17 DIAGNOSIS — D35.2 PROLACTINOMA (H): ICD-10-CM

## 2022-01-17 LAB
ANION GAP SERPL CALCULATED.3IONS-SCNC: 2 MMOL/L (ref 3–14)
BUN SERPL-MCNC: 12 MG/DL (ref 7–30)
CALCIUM SERPL-MCNC: 9 MG/DL (ref 8.5–10.1)
CHLORIDE BLD-SCNC: 106 MMOL/L (ref 94–109)
CO2 SERPL-SCNC: 31 MMOL/L (ref 20–32)
CORTIS SERPL-MCNC: 4.6 UG/DL (ref 4–22)
CREAT SERPL-MCNC: 0.6 MG/DL (ref 0.52–1.04)
GFR SERPL CREATININE-BSD FRML MDRD: >90 ML/MIN/1.73M2
GLUCOSE BLD-MCNC: 99 MG/DL (ref 70–99)
POTASSIUM BLD-SCNC: 3.7 MMOL/L (ref 3.4–5.3)
PROLACTIN SERPL-MCNC: 21 UG/L (ref 3–27)
SODIUM SERPL-SCNC: 139 MMOL/L (ref 133–144)
T3FREE SERPL-MCNC: 2.7 PG/ML (ref 2.3–4.2)
T4 FREE SERPL-MCNC: 1.05 NG/DL (ref 0.76–1.46)
TSH SERPL DL<=0.005 MIU/L-ACNC: 0.6 MU/L (ref 0.4–4)

## 2022-01-17 PROCEDURE — 84439 ASSAY OF FREE THYROXINE: CPT | Performed by: INTERNAL MEDICINE

## 2022-01-17 PROCEDURE — 82533 TOTAL CORTISOL: CPT | Performed by: INTERNAL MEDICINE

## 2022-01-17 PROCEDURE — 36415 COLL VENOUS BLD VENIPUNCTURE: CPT | Performed by: INTERNAL MEDICINE

## 2022-01-17 PROCEDURE — 99000 SPECIMEN HANDLING OFFICE-LAB: CPT | Performed by: INTERNAL MEDICINE

## 2022-01-17 PROCEDURE — 84146 ASSAY OF PROLACTIN: CPT | Performed by: INTERNAL MEDICINE

## 2022-01-17 PROCEDURE — 84481 FREE ASSAY (FT-3): CPT | Performed by: INTERNAL MEDICINE

## 2022-01-17 PROCEDURE — 84443 ASSAY THYROID STIM HORMONE: CPT | Performed by: INTERNAL MEDICINE

## 2022-01-17 PROCEDURE — 84445 ASSAY OF TSI GLOBULIN: CPT | Mod: 90 | Performed by: INTERNAL MEDICINE

## 2022-01-17 PROCEDURE — 82024 ASSAY OF ACTH: CPT | Performed by: INTERNAL MEDICINE

## 2022-01-17 PROCEDURE — 84305 ASSAY OF SOMATOMEDIN: CPT | Performed by: INTERNAL MEDICINE

## 2022-01-17 PROCEDURE — 80048 BASIC METABOLIC PNL TOTAL CA: CPT | Performed by: INTERNAL MEDICINE

## 2022-01-17 PROCEDURE — 99204 OFFICE O/P NEW MOD 45 MIN: CPT | Performed by: INTERNAL MEDICINE

## 2022-01-17 ASSESSMENT — MIFFLIN-ST. JEOR: SCORE: 1170.25

## 2022-01-17 NOTE — LETTER
"    1/17/2022         RE: Soni Moody  [id number]  [place] [address]        Dear Colleague,    Thank you for referring your patient, Soni Moody, to the [facility] ENDOCRINOLOGY. Please see a copy of my visit note below.    CC: Prolactinoma.     HPI: Patient presents for management of a prolactinoma.   Outside history from 2008:  \"[age]-year-old female patient was last  seen on 28th July 2008. She has a known pituitary microadenoma. She has a  history of possible prolactin elevation. She has also had problems with  galactorrhea. In the recent past, she says that the galactorrhea has not  been spontaneous, but small amounts of discharge can be stressed on  attempt. She was seen in July. She was pregnant then. She has a history  of a miscarriage 12 years ago and apparently the galactorrhea started then  and persisted subsequently, although the prolactin levels were noted to be  normal. Please also see my note from the 28th of July of this year for  details. She had regular menses. In May 2005, she had an MRI, which  showed only a 6 x 7 mm lesion in the pituitary. In July, she was 15 weeks  pregnant. Her visual field exam was ordered, but has not been done yet.  She is dealing with some insurance issues with regard to that. Visual  fields by confrontation have been normal. We have followed her prolactin  levels and they kylah to more than 200 as would be expected for her  pregnancy. We will discontinue checking at this point. She is already at  32 weeks of gestation and clinical follow up is all that is needed. Today,  she tells me she feels well. She has no headaches. No visual changes and  just some lower back pain. Otherwise, her pregnancy is going well. She  has had no change in her breast discharge at this point.  On examination, she is conscious, cooperative, alert, well oriented in  space, time, and person. No evidence of distress. Blood pressure is 84/52  mmHg. Pulse 80 per " "minute and weight 134 pounds, 9.6 ounces.  ASSESSMENT AND PLAN: [age]-year-old female patient with pituitary  microadenoma, galactorrhea, and pregnancy. Clinically, she is well. There  is no evidence of any excessive growth of the pituitary lesion as best we  can make out. She is close to delivery at this point and based on  recommendations, we will simply allow her to go ahead with that and then  followup her prolactin levels two months post delivery and plan then for  further treatment.\"    She reports at age 22 she presented with galactorrhea.   She was on bromocriptine. Recalls stopping prior to 2005.     In 2018, she presented with fatigue and found to be hyperthyroid.     Uptake and scan 11/7/2018:  Thyroid gland  appears relatively normal in size and  homogeneous in  uptake. 24-hour uptake was calculated at 68% which is  above the normal range.     She was place on methimazole and took this for two years.     Comes in today because she is feeling tired again.   This began about 1 month ago. No recent illness.   No trouble falling asleep or staying asleep.   She does not wake up feeling tired.     Using Mirena IUD.   No galactorrhea.   No n/v/d.   No recent change in weight.   Occasional palpitations unrelated to activity. Occur once a month.     ROS: 10 point ROS neg other than the symptoms noted above in the HPI.    PMH:   Patient Active Problem List   Diagnosis     CARDIOVASCULAR SCREENING; LDL GOAL LESS THAN 160     Titers for Hep A and Rubella shows immunity, per [facility] records.     Prolactinoma (H)     Dental infection     Family history of liver cancer     Other anterior pituitary disorders     mirena IUD     Cervical high risk HPV (human papillomavirus) test positive     Graves' disease      Meds:  Current Outpatient Medications   Medication     ferrous sulfate 134 MG TABS     levonorgestrel (MIRENA) 20 MCG/24HR IUD     Multiple Vitamin (MULTIVITAMIN ADULT PO)     No current " "facility-administered medications for this visit.      FHX:   No known thyroid or pituitary issues.     SHX:  She has two children, boy and girl.   Works as .     Exam:   Vital signs:      BP: 90/58 Pulse: 75   Resp: 16       Height: 157.5 cm (5' 2\") Weight: 56.7 kg (125 lb)  Estimated body mass index is 22.86 kg/m  as calculated from the following:    Height as of this encounter: 1.575 m (5' 2\").    Weight as of this encounter: 56.7 kg (125 lb).  Gen: In NAD.   HEENT: no proptosis or lid lag, EOMI, no palpable thyroid tissue.  Card: S1 S2 RRR no m/r/g. no LE edema.   Pulm: CTA b/l.   GI: NT ND +BS.   MSK: no gross deformities.   Derm: no rashes or lesions.   Neuro: no tremor, +2 DTR's. Normal visual fields.     A/P:   Prolactinoma - Presented with galactorrhea.  Previously treated with bromocriptine. Last use in 2005.   Mass effect- none noted on last MRI. 9/9/05:     -Schedule MRI only if prolactin elevated or other abnormal lab.   Water - no polyuria or polydipsia.    Check Na.   Cortisol - check cortisol and ACTH.   Thyroid -as below  Gonads - on mirena.   Growth hormone - check IGF-1.   Prolactin - Check prolactin level. Discussed cabergoline and bromocriptine.     Hyperthyroidism - Extensive discussion of thyroid hormone and normal physiology. Included was discussion of thyroid in  relation to weight and energy.  On methimazole from 0238-3626.   Uptake and scan 11/7/2018:  Thyroid gland  appears relatively normal in size and  homogeneous in  uptake. 24-hour uptake was calculated at 68% which is  above the normal range.    Discussed methimazole, surgery, and I 131.   -Labs today including TSI.       Manuel Pascual M.D          Again, thank you for allowing me to participate in the care of your patient.        Sincerely,        Manuel Pascual MD    "

## 2022-01-17 NOTE — PROGRESS NOTES
"CC: Prolactinoma.     HPI: Patient presents for management of a prolactinoma.   Outside history from 2008:  \"[age]-year-old female patient was last  seen on 28th July 2008. She has a known pituitary microadenoma. She has a  history of possible prolactin elevation. She has also had problems with  galactorrhea. In the recent past, she says that the galactorrhea has not  been spontaneous, but small amounts of discharge can be stressed on  attempt. She was seen in July. She was pregnant then. She has a history  of a miscarriage 12 years ago and apparently the galactorrhea started then  and persisted subsequently, although the prolactin levels were noted to be  normal. Please also see my note from the 28th of July of this year for  details. She had regular menses. In May 2005, she had an MRI, which  showed only a 6 x 7 mm lesion in the pituitary. In July, she was 15 weeks  pregnant. Her visual field exam was ordered, but has not been done yet.  She is dealing with some insurance issues with regard to that. Visual  fields by confrontation have been normal. We have followed her prolactin  levels and they kylah to more than 200 as would be expected for her  pregnancy. We will discontinue checking at this point. She is already at  32 weeks of gestation and clinical follow up is all that is needed. Today,  she tells me she feels well. She has no headaches. No visual changes and  just some lower back pain. Otherwise, her pregnancy is going well. She  has had no change in her breast discharge at this point.  On examination, she is conscious, cooperative, alert, well oriented in  space, time, and person. No evidence of distress. Blood pressure is 84/52  mmHg. Pulse 80 per minute and weight 134 pounds, 9.6 ounces.  ASSESSMENT AND PLAN: [age]-year-old female patient with pituitary  microadenoma, galactorrhea, and pregnancy. Clinically, she is well. There  is no evidence of any excessive growth of the pituitary lesion as " "best we  can make out. She is close to delivery at this point and based on  recommendations, we will simply allow her to go ahead with that and then  followup her prolactin levels two months post delivery and plan then for  further treatment.\"    She reports at age 22 she presented with galactorrhea.   She was on bromocriptine. Recalls stopping prior to 2005.     In 2018, she presented with fatigue and found to be hyperthyroid.     Uptake and scan 11/7/2018:  Thyroid gland  appears relatively normal in size and  homogeneous in  uptake. 24-hour uptake was calculated at 68% which is  above the normal range.     She was place on methimazole and took this for two years.     Comes in today because she is feeling tired again.   This began about 1 month ago. No recent illness.   No trouble falling asleep or staying asleep.   She does not wake up feeling tired.     Using Mirena IUD.   No galactorrhea.   No n/v/d.   No recent change in weight.   Occasional palpitations unrelated to activity. Occur once a month.     ROS: 10 point ROS neg other than the symptoms noted above in the HPI.    PMH:   Patient Active Problem List   Diagnosis     CARDIOVASCULAR SCREENING; LDL GOAL LESS THAN 160     Titers for Hep A and Rubella shows immunity, per [facility] records.     Prolactinoma (H)     Dental infection     Family history of liver cancer     Other anterior pituitary disorders     mirena IUD     Cervical high risk HPV (human papillomavirus) test positive     Graves' disease      Meds:  Current Outpatient Medications   Medication     ferrous sulfate 134 MG TABS     levonorgestrel (MIRENA) 20 MCG/24HR IUD     Multiple Vitamin (MULTIVITAMIN ADULT PO)     No current facility-administered medications for this visit.      FHX:   No known thyroid or pituitary issues.     SHX:  She has two children, boy and girl.   Works as .     Exam:   Vital signs:      BP: 90/58 Pulse: 75   Resp: 16       Height: 157.5 cm (5' 2\") Weight: " "56.7 kg (125 lb)  Estimated body mass index is 22.86 kg/m  as calculated from the following:    Height as of this encounter: 1.575 m (5' 2\").    Weight as of this encounter: 56.7 kg (125 lb).  Gen: In NAD.   HEENT: no proptosis or lid lag, EOMI, no palpable thyroid tissue.  Card: S1 S2 RRR no m/r/g. no LE edema.   Pulm: CTA b/l.   GI: NT ND +BS.   MSK: no gross deformities.   Derm: no rashes or lesions.   Neuro: no tremor, +2 DTR's. Normal visual fields.     A/P:   Prolactinoma - Presented with galactorrhea.  Previously treated with bromocriptine. Last use in 2005.   Mass effect- none noted on last MRI. 9/9/05:     -Schedule MRI only if prolactin elevated or other abnormal lab.   Water - no polyuria or polydipsia.    Check Na.   Cortisol - check cortisol and ACTH.   Thyroid -as below  Gonads - on mirena.   Growth hormone - check IGF-1.   Prolactin - Check prolactin level. Discussed cabergoline and bromocriptine.     Hyperthyroidism - Extensive discussion of thyroid hormone and normal physiology. Included was discussion of thyroid in  relation to weight and energy.  On methimazole from [id number].   Uptake and scan 11/7/2018:  Thyroid gland  appears relatively normal in size and  homogeneous in  uptake. 24-hour uptake was calculated at 68% which is  above the normal range.    Discussed methimazole, surgery, and I 131.   -Labs today including TSI.       Manuel Pascual M.D"

## 2022-01-17 NOTE — NURSING NOTE
"Chief Complaint   Patient presents with     New Patient     Thyroid Disease       Initial BP 90/58 (BP Location: Left arm, Patient Position: Sitting, Cuff Size: Adult Regular)   Pulse 75   Resp 16   Ht 1.575 m (5' 2\")   Wt 56.7 kg (125 lb)   BMI 22.86 kg/m   Estimated body mass index is 22.86 kg/m  as calculated from the following:    Height as of this encounter: 1.575 m (5' 2\").    Weight as of this encounter: 56.7 kg (125 lb).  BP completed using cuff size: regular  Medications and allergies reviewed.      Della DUPREE MA    "

## 2022-01-18 LAB
ACTH PLAS-MCNC: <10 PG/ML
IGF-I BLD-MCNC: 184 NG/ML (ref 69–253)

## 2022-01-19 LAB — TSI SER-ACNC: 1.2 TSI INDEX

## 2022-01-20 DIAGNOSIS — E05.00 GRAVES' DISEASE: ICD-10-CM

## 2022-01-20 DIAGNOSIS — D35.2 PROLACTINOMA (H): Primary | ICD-10-CM

## 2022-01-24 ENCOUNTER — VIRTUAL VISIT (OUTPATIENT)
Dept: FAMILY MEDICINE | Facility: CLINIC | Age: 47
End: 2022-01-24
Payer: COMMERCIAL

## 2022-01-24 ENCOUNTER — ALLIED HEALTH/NURSE VISIT (OUTPATIENT)
Dept: FAMILY MEDICINE | Facility: CLINIC | Age: 47
End: 2022-01-24
Payer: COMMERCIAL

## 2022-01-24 ENCOUNTER — LAB (OUTPATIENT)
Dept: LAB | Facility: CLINIC | Age: 47
End: 2022-01-24
Payer: COMMERCIAL

## 2022-01-24 DIAGNOSIS — J02.9 PHARYNGITIS, UNSPECIFIED ETIOLOGY: ICD-10-CM

## 2022-01-24 DIAGNOSIS — J02.9 PHARYNGITIS, UNSPECIFIED ETIOLOGY: Primary | ICD-10-CM

## 2022-01-24 DIAGNOSIS — R07.0 THROAT PAIN: Primary | ICD-10-CM

## 2022-01-24 LAB
DEPRECATED S PYO AG THROAT QL EIA: NEGATIVE
GROUP A STREP BY PCR: NOT DETECTED
MONOCYTES NFR BLD AUTO: NEGATIVE %

## 2022-01-24 PROCEDURE — 87651 STREP A DNA AMP PROBE: CPT

## 2022-01-24 PROCEDURE — 99213 OFFICE O/P EST LOW 20 MIN: CPT | Mod: 95 | Performed by: NURSE PRACTITIONER

## 2022-01-24 PROCEDURE — 99207 PR NO CHARGE NURSE ONLY: CPT

## 2022-01-24 PROCEDURE — 86308 HETEROPHILE ANTIBODY SCREEN: CPT

## 2022-01-24 PROCEDURE — 36415 COLL VENOUS BLD VENIPUNCTURE: CPT

## 2022-01-24 RX ORDER — PREDNISONE 20 MG/1
20 TABLET ORAL 2 TIMES DAILY
Qty: 10 TABLET | Refills: 0 | Status: SHIPPED | OUTPATIENT
Start: 2022-01-24 | End: 2022-01-29

## 2022-01-24 NOTE — PROGRESS NOTES
Soni is a 46 year old who is being evaluated via a billable telephone visit.      What phone number would you like to be contacted at? [phone]   How would you like to obtain your AVS? MyChart    Assessment & Plan     Pharyngitis, unspecified etiology  -symptoms due to Covid, recommended to rule out possible strep and Mono  -discussed symptomatic treatment   - Streptococcus A Rapid Scr w Reflx to PCR - Lab Collect; Future  - Mononucleosis screen; Future  - predniSONE (DELTASONE) 20 MG tablet; Take 1 tablet (20 mg) by mouth 2 times daily for 5 days    MOISES Caballero CNP  [facility]    Subjective   Soni is a 46 year old who presents for the following health issues     Chief Complaint   Patient presents with     Pharyngitis     Tested positive 6 days ago for Covid          HPI     Acute Illness  Acute illness concerns: Sore throat   Onset/Duration: 6 days   Symptoms:  Fever: no  Chills/Sweats: no  Headache (location?): YES   Sinus Pressure: no  Conjunctivitis:  no  Ear Pain: no  Rhinorrhea: no  Congestion: YES   Sore Throat: YES  Cough: YES  Wheeze: no  Decreased Appetite: no   Nausea: no  Vomiting: no  Diarrhea: no  Dysuria/Freq.: no  Dysuria or Hematuria: no  Fatigue/Achiness: YES   Therapies tried and outcome: mucinex       Review of Systems   Constitutional, HEENT, cardiovascular, pulmonary, gi and gu systems are negative, except as otherwise noted.      Objective           Vitals:  No vitals were obtained today due to virtual visit.    Physical Exam   healthy, alert and no distress  PSYCH: Alert and oriented times 3; coherent speech, normal   rate and volume, able to articulate logical thoughts, able   to abstract reason, no tangential thoughts, no hallucinations   or delusions  Her affect is normal  RESP: No cough, no audible wheezing, able to talk in full sentences  Remainder of exam unable to be completed due to telephone visits                Phone call duration: 6  minutes

## 2022-01-24 NOTE — NURSING NOTE
Patient here for strep swab. Wearing full ppe throat swabs were obtained. Patient was advised she will be notified when results are in . - Cristian YI CMA

## 2022-01-27 ENCOUNTER — TELEPHONE (OUTPATIENT)
Dept: FAMILY MEDICINE | Facility: CLINIC | Age: 47
End: 2022-01-27
Payer: COMMERCIAL

## 2022-01-27 NOTE — TELEPHONE ENCOUNTER
Reason for call:  Patient reporting a symptom    Symptom or request: Pt has Covid and says she has been just laying around for the last 9 days. She says she has no energy. She has a hard time eating and drinking. She is wondering if she needs to go to the ER to get some IV's to get her energy back. Please call to advise .    Phone Number patient can be reached at:  Home number on file [phone] (home)    Best Time:  anytime    Can we leave a detailed message on this number:  YES    Call taken on 1/27/2022 at 7:58 AM by Va Luke

## 2022-01-27 NOTE — TELEPHONE ENCOUNTER
"S-(situation): returned call to patient.     Tested covid positive 9 days ago. She states is hard to eat and drink due to feeling like she is going to puke when she does. States she does have urine output, does not look at color of urine. She feels very weak and is only getting up to use the bath room.    No fever, no cough, no soa. No body ache.    She is advised to go to ER. She is hesitant to do this. We discussed increasing her food and fluids. Getting up and moving. She states understanding and \"will try\". If no improvement she should seek evaluation at ER  "

## 2022-04-04 ENCOUNTER — IMMUNIZATION (OUTPATIENT)
Dept: FAMILY MEDICINE | Facility: CLINIC | Age: 47
End: 2022-04-04
Payer: COMMERCIAL

## 2022-04-04 PROCEDURE — 0051A COVID-19,PF,PFIZER (12+ YRS): CPT

## 2022-04-04 PROCEDURE — 91305 COVID-19,PF,PFIZER (12+ YRS): CPT

## 2022-05-09 ENCOUNTER — IMMUNIZATION (OUTPATIENT)
Dept: FAMILY MEDICINE | Facility: CLINIC | Age: 47
End: 2022-05-09
Attending: NURSE PRACTITIONER
Payer: COMMERCIAL

## 2022-05-09 PROCEDURE — 91305 COVID-19,PF,PFIZER (12+ YRS): CPT

## 2022-05-09 PROCEDURE — 0052A COVID-19,PF,PFIZER (12+ YRS): CPT

## 2022-07-01 ENCOUNTER — OFFICE VISIT (OUTPATIENT)
Dept: FAMILY MEDICINE | Facility: CLINIC | Age: 47
End: 2022-07-01
Payer: COMMERCIAL

## 2022-07-01 VITALS
WEIGHT: 125.6 LBS | RESPIRATION RATE: 16 BRPM | SYSTOLIC BLOOD PRESSURE: 106 MMHG | BODY MASS INDEX: 23.11 KG/M2 | HEIGHT: 62 IN | TEMPERATURE: 97.2 F | HEART RATE: 71 BPM | DIASTOLIC BLOOD PRESSURE: 66 MMHG | OXYGEN SATURATION: 99 %

## 2022-07-01 DIAGNOSIS — Z12.4 CERVICAL CANCER SCREENING: ICD-10-CM

## 2022-07-01 DIAGNOSIS — E05.00 GRAVES' DISEASE: ICD-10-CM

## 2022-07-01 DIAGNOSIS — E22.9 HYPERFUNCTION OF PITUITARY GLAND, UNSPECIFIED (H): ICD-10-CM

## 2022-07-01 DIAGNOSIS — R74.8 ELEVATED LIVER ENZYMES: ICD-10-CM

## 2022-07-01 DIAGNOSIS — Z00.00 ROUTINE GENERAL MEDICAL EXAMINATION AT A HEALTH CARE FACILITY: Primary | ICD-10-CM

## 2022-07-01 LAB
ALBUMIN SERPL-MCNC: 3.8 G/DL (ref 3.4–5)
ALP SERPL-CCNC: 42 U/L (ref 40–150)
ALT SERPL W P-5'-P-CCNC: 23 U/L (ref 0–50)
ANION GAP SERPL CALCULATED.3IONS-SCNC: 2 MMOL/L (ref 3–14)
AST SERPL W P-5'-P-CCNC: 16 U/L (ref 0–45)
BILIRUB SERPL-MCNC: 1.1 MG/DL (ref 0.2–1.3)
BUN SERPL-MCNC: 18 MG/DL (ref 7–30)
CALCIUM SERPL-MCNC: 8.6 MG/DL (ref 8.5–10.1)
CHLORIDE BLD-SCNC: 108 MMOL/L (ref 94–109)
CO2 SERPL-SCNC: 28 MMOL/L (ref 20–32)
CREAT SERPL-MCNC: 0.51 MG/DL (ref 0.52–1.04)
GFR SERPL CREATININE-BSD FRML MDRD: >90 ML/MIN/1.73M2
GLUCOSE BLD-MCNC: 88 MG/DL (ref 70–99)
POTASSIUM BLD-SCNC: 4.4 MMOL/L (ref 3.4–5.3)
PROLACTIN SERPL 3RD IS-MCNC: 13 NG/ML (ref 5–23)
PROT SERPL-MCNC: 7.4 G/DL (ref 6.8–8.8)
SODIUM SERPL-SCNC: 138 MMOL/L (ref 133–144)
T3FREE SERPL-MCNC: 3 PG/ML (ref 2–4.4)
T4 FREE SERPL-MCNC: 1.01 NG/DL (ref 0.76–1.46)
TSH SERPL DL<=0.005 MIU/L-ACNC: 0.9 MU/L (ref 0.4–4)

## 2022-07-01 PROCEDURE — 36415 COLL VENOUS BLD VENIPUNCTURE: CPT | Performed by: FAMILY MEDICINE

## 2022-07-01 PROCEDURE — 99214 OFFICE O/P EST MOD 30 MIN: CPT | Mod: 25 | Performed by: FAMILY MEDICINE

## 2022-07-01 PROCEDURE — 99396 PREV VISIT EST AGE 40-64: CPT | Performed by: FAMILY MEDICINE

## 2022-07-01 PROCEDURE — 84443 ASSAY THYROID STIM HORMONE: CPT | Performed by: FAMILY MEDICINE

## 2022-07-01 PROCEDURE — 80053 COMPREHEN METABOLIC PANEL: CPT | Performed by: FAMILY MEDICINE

## 2022-07-01 PROCEDURE — G0145 SCR C/V CYTO,THINLAYER,RESCR: HCPCS | Performed by: FAMILY MEDICINE

## 2022-07-01 PROCEDURE — 87624 HPV HI-RISK TYP POOLED RSLT: CPT | Performed by: FAMILY MEDICINE

## 2022-07-01 PROCEDURE — 84146 ASSAY OF PROLACTIN: CPT | Performed by: FAMILY MEDICINE

## 2022-07-01 PROCEDURE — 84439 ASSAY OF FREE THYROXINE: CPT | Performed by: FAMILY MEDICINE

## 2022-07-01 PROCEDURE — 84481 FREE ASSAY (FT-3): CPT | Performed by: FAMILY MEDICINE

## 2022-07-01 ASSESSMENT — PAIN SCALES - GENERAL: PAINLEVEL: NO PAIN (0)

## 2022-07-01 ASSESSMENT — ENCOUNTER SYMPTOMS
HEMATOCHEZIA: 0
NERVOUS/ANXIOUS: 0
WEAKNESS: 0
FREQUENCY: 0
COUGH: 0
PALPITATIONS: 0
DIZZINESS: 0
NAUSEA: 0
CONSTIPATION: 0
HEADACHES: 0
BREAST MASS: 0
ABDOMINAL PAIN: 0
ARTHRALGIAS: 0
FEVER: 0
HEMATURIA: 0
DIARRHEA: 0
MYALGIAS: 0
SORE THROAT: 0
HEARTBURN: 0
CHILLS: 0
PARESTHESIAS: 0
JOINT SWELLING: 0
DYSURIA: 0
SHORTNESS OF BREATH: 0
EYE PAIN: 0

## 2022-07-01 NOTE — PROGRESS NOTES
SUBJECTIVE:   CC: Soni Moody is an 46 year old woman who presents for preventive health visit.     Patient has been advised of split billing requirements and indicates understanding: Yes  Healthy Habits:     Getting at least 3 servings of Calcium per day:  Yes    Bi-annual eye exam:  Yes    Dental care twice a year:  Yes    Sleep apnea or symptoms of sleep apnea:  None    Diet:  Regular (no restrictions)    Frequency of exercise:  2-3 days/week    Duration of exercise:  15-30 minutes    Taking medications regularly:  Yes    PHQ-2 Total Score: 0    Additional concerns today:  No    Age 22 had galactorrhea and was on bromocriptine, stopped prior to .  2018 had hyperthyroid on methimazole for 2 years.  Saw Dr. Samara GONZALES an prolactin was normal in pregnancy 2022, so no MRI orders. Mentioned if prolactin elevated then plan MRI pituitary.  Not breastfeeding.  No nipple discharge.  Mirena IUD 2018, no periods.'    Mom  of liver caner. Patient had had negative Hep B and C tests and was immunized for Hep B. Patient had elevated liver functions testing in 2018 when also had high thyroid and never rechecked liver enzymes after thyroid back to normal.      Today's PHQ-2 Score:   PHQ-2 (  [employer]) 2022   Q1: Little interest or pleasure in doing things 0   Q2: Feeling down, depressed or hopeless 0   PHQ-2 Score 0   PHQ-2 Total Score (12-17 Years)- Positive if 3 or more points; Administer PHQ-A if positive -   Q1: Little interest or pleasure in doing things Not at all   Q2: Feeling down, depressed or hopeless Not at all   PHQ-2 Score 0       Abuse: Current or Past (Physical, Sexual or Emotional) - No  Do you feel safe in your environment? Yes        Social History     Tobacco Use     Smoking status: Never Smoker     Smokeless tobacco: Never Used   Substance Use Topics     Alcohol use: Yes     Comment: very rare     If you drink alcohol do you typically have >3 drinks per day or >7 drinks per week?  No    Alcohol Use 7/1/2022   Prescreen: >3 drinks/day or >7 drinks/week? No   Prescreen: >3 drinks/day or >7 drinks/week? -   No flowsheet data found.    Reviewed orders with patient.  Reviewed health maintenance and updated orders accordingly - Yes  BP Readings from Last 3 Encounters:   07/01/22 106/66   01/17/22 90/58   12/02/21 (!) 88/60    Wt Readings from Last 3 Encounters:   07/01/22 57 kg (125 lb 9.6 oz)   01/17/22 56.7 kg (125 lb)   12/02/21 56.7 kg (125 lb)                    Breast Cancer Screening:    Breast CA Risk Assessment (FHS-7) 5/23/2021   Do you have a family history of breast, colon, or ovarian cancer? No / Unknown         Mammogram Screening - Offered annual screening and updated Health Maintenance for mutual plan based on risk factor consideration    Pertinent mammograms are reviewed under the imaging tab.    History of abnormal Pap smear: NO - age 30-65 PAP every 5 years with negative HPV co-testing recommended  PAP / HPV Latest Ref Rng & Units 5/24/2021 4/1/2019 3/29/2016   PAP (Historical) - NIL NIL NIL   HPV16 NEG:Negative Negative Negative -   HPV18 NEG:Negative Negative Negative -   HRHPV NEG:Negative Negative Positive(A) -     Reviewed and updated as needed this visit by clinical staff   Tobacco  Allergies  Meds   Med Hx  Surg Hx  Fam Hx  Soc Hx          Reviewed and updated as needed this visit by Provider                       Review of Systems   Constitutional: Negative for chills and fever.   HENT: Negative for congestion, ear pain, hearing loss and sore throat.    Eyes: Negative for pain and visual disturbance.   Respiratory: Negative for cough and shortness of breath.    Cardiovascular: Negative for chest pain, palpitations and peripheral edema.   Gastrointestinal: Negative for abdominal pain, constipation, diarrhea, heartburn, hematochezia and nausea.   Breasts:  Positive for discharge. Negative for breast mass.   Genitourinary: Negative for dysuria, frequency, genital  "sores, hematuria, pelvic pain, urgency, vaginal bleeding and vaginal discharge.   Musculoskeletal: Negative for arthralgias, joint swelling and myalgias.   Skin: Negative for rash.   Neurological: Negative for dizziness, weakness, headaches and paresthesias.   Psychiatric/Behavioral: Negative for mood changes. The patient is not nervous/anxious.         OBJECTIVE:   /66   Pulse 71   Temp 97.2  F (36.2  C) (Tympanic)   Resp 16   Ht 1.581 m (5' 2.25\")   Wt 57 kg (125 lb 9.6 oz)   SpO2 99%   BMI 22.79 kg/m    Physical Exam  GENERAL: healthy, alert and no distress  EYES: Eyes grossly normal to inspection, PERRL and conjunctivae and sclerae normal  HENT: ear canals and TM's normal, mask in place.  NECK: no adenopathy, no asymmetry, masses, or scars and thyroid normal to palpation  RESP: lungs clear to auscultation - no rales, rhonchi or wheezes  BREAST: normal without masses, tenderness or nipple discharge and no palpable axillary masses or adenopathy  CV: regular rate and rhythm, normal S1 S2, no S3 or S4, no murmur, click or rub, no peripheral edema and peripheral pulses strong  ABDOMEN: soft, nontender, no hepatosplenomegaly, no masses and bowel sounds normal   (female): normal female external genitalia, normal urethral meatus, vaginal mucosa pink, moist, well rugated, and normal cervix with IUD strings seen without masses or discharge  MS: no gross musculoskeletal defects noted, no edema  SKIN: no suspicious lesions or rashes  NEURO: Normal strength and tone, mentation intact and speech normal  PSYCH: mentation appears normal, affect normal/bright    Diagnostic Test Results:  Labs reviewed in Epic    ASSESSMENT/PLAN:   Soni was seen today for physical.    Diagnoses and all orders for this visit:    Routine general medical examination at a health care facility    Cervical cancer screening  -     Pap Screen with HPV - recommended age 30 - 65 years    Graves' disease: plan to recheck yearly.  If " "abnormal referral back to endo.  -     TSH; Future  -     T4 FREE; Future  -     T3, Free; Future    Elevated liver enzymes: in 2018 when thyroid was high, so likely  -     Comprehensive metabolic panel (BMP + Alb, Alk Phos, ALT, AST, Total. Bili, TP); Future    Hyperfunction of pituitary gland, unspecified (H): normal prolactins recently, recheck and plan to check yearly. I abnormal refer back to ENDO.  -     Prolactin; Future    Other orders  -     REVIEW OF HEALTH MAINTENANCE PROTOCOL ORDERS        Patient has been advised of split billing requirements and indicates understanding: Yes    COUNSELING:  Reviewed preventive health counseling, as reflected in patient instructions       Regular exercise       Healthy diet/nutrition       Vision screening    Estimated body mass index is 22.79 kg/m  as calculated from the following:    Height as of this encounter: 1.581 m (5' 2.25\").    Weight as of this encounter: 57 kg (125 lb 9.6 oz).        She reports that she has never smoked. She has never used smokeless tobacco.      Counseling Resources:  ATP IV Guidelines  Pooled Cohorts Equation Calculator  Breast Cancer Risk Calculator  BRCA-Related Cancer Risk Assessment: FHS-7 Tool  FRAX Risk Assessment  ICSI Preventive Guidelines  Dietary Guidelines for Americans, 2010  USDA's MyPlate  ASA Prophylaxis  Lung CA Screening    Tuan Dunne MD  [facility]  "

## 2022-07-01 NOTE — PATIENT INSTRUCTIONS
To lab  We're checking liver functions that were up in 2018 when the thyroid was high, so i'm guessing they'll be back down to normal.  We'll check prolactin and if high plan pituitary MRI  Otherwise if thyroid and prolactin all normal, we'll just plan to recheck yearly with labs, if they ever are not in normal we'll plan to see endocrinology again.    IUD placed 2/2018, it is now approved to prevent pregnancy for up to 7 years. Sometimes we switch it earlier if heavy or more frequent periods.    Preventive Health Recommendations  Female Ages 40 to 49    Yearly exam:   See your health care provider every year in order to  Review health changes.   Discuss preventive care.    Review your medicines if your doctor prescribed any.    Get a Pap test every three years (unless you have an abnormal result and your provider advises testing more often).    If you get Pap tests with HPV test, you only need to test every 5 years, unless you have an abnormal result. You do not need a Pap test if your uterus was removed (hysterectomy) and you have not had cancer.    You should be tested each year for STDs (sexually transmitted diseases), if you're at risk.   Ask your doctor if you should have a mammogram.    Have a colonoscopy (test for colon cancer) if someone in your family has had colon cancer or polyps before age 50.     Have a cholesterol test every 5 years.     Have a diabetes test (fasting glucose) after age 45. If you are at risk for diabetes, you should have this test every 3 years.    Shots: Get a flu shot each year. Get a tetanus shot every 10 years.     Nutrition:   Eat at least 5 servings of fruits and vegetables each day.  Eat whole-grain bread, whole-wheat pasta and brown rice instead of white grains and rice.  Get adequate Calcium and Vitamin D.      Lifestyle  Exercise at least 150 minutes a week (an average of 30 minutes a day, 5 days a week). This will help you control your weight and prevent disease.  Limit  alcohol to one drink per day.  No smoking.   Wear sunscreen to prevent skin cancer.  See your dentist every six months for an exam and cleaning.

## 2022-07-06 LAB
BKR LAB AP GYN ADEQUACY: NORMAL
BKR LAB AP GYN INTERPRETATION: NORMAL
BKR LAB AP HPV REFLEX: NORMAL
BKR LAB AP PREVIOUS ABNORMAL: NORMAL
PATH REPORT.COMMENTS IMP SPEC: NORMAL
PATH REPORT.COMMENTS IMP SPEC: NORMAL
PATH REPORT.RELEVANT HX SPEC: NORMAL

## 2022-07-08 ENCOUNTER — PATIENT OUTREACH (OUTPATIENT)
Dept: FAMILY MEDICINE | Facility: CLINIC | Age: 47
End: 2022-07-08

## 2022-07-08 LAB
HUMAN PAPILLOMA VIRUS 16 DNA: NEGATIVE
HUMAN PAPILLOMA VIRUS 18 DNA: NEGATIVE
HUMAN PAPILLOMA VIRUS FINAL DIAGNOSIS: ABNORMAL
HUMAN PAPILLOMA VIRUS OTHER HR: POSITIVE

## 2022-09-04 ENCOUNTER — HEALTH MAINTENANCE LETTER (OUTPATIENT)
Age: 47
End: 2022-09-04

## 2023-01-21 ENCOUNTER — HEALTH MAINTENANCE LETTER (OUTPATIENT)
Age: 48
End: 2023-01-21

## 2023-04-19 ENCOUNTER — TELEPHONE (OUTPATIENT)
Dept: FAMILY MEDICINE | Facility: CLINIC | Age: 48
End: 2023-04-19
Payer: COMMERCIAL

## 2023-04-19 NOTE — LETTER
April 19, 2023      Soni Moody  [id number]  [place] [address]        Dear Soni,     Your team at [facility] cares about your health. We have reviewed your chart and based on our findings; we are making the following recommendations to better manage your health.     You are in particular need of attention regarding the following:     Schedule Annual MAMMOGRAPHY. [facility] scheduling number is [phone] or schedule in [facility] (self referral).  1 in 8 women will develop invasive breast cancer during her lifetime and it is the most common non-skin cancer in American Women. EARLY detection, new treatments, and a better understanding of the disease have increased survival rates- the 5 year survival rate in the 1960's was 63% and today it is close to 90%.  PREVENTATIVE VISIT: Physical  - please schedule physical anytime after July 1st, 2023.    If you have already completed these items, please contact the clinic via phone or   [facility] so your care team can review and update your records. Thank you for   choosing [facility] for your healthcare needs. For any questions,   concerns, or to schedule an appointment please contact our clinic.    Healthy Regards,      Your [facility] Care Team

## 2023-04-19 NOTE — TELEPHONE ENCOUNTER
Patient Quality Outreach    Patient is due for the following:   Breast Cancer Screening - Mammogram  Physical Preventive Adult Physical,  - Due after 7/1/2023    Next Steps:   Schedule a Adult Preventative    Type of outreach:    Sent letter.      Questions for provider review:    None     Ibeth Cohen

## 2023-07-06 ENCOUNTER — OFFICE VISIT (OUTPATIENT)
Dept: FAMILY MEDICINE | Facility: CLINIC | Age: 48
End: 2023-07-06
Payer: COMMERCIAL

## 2023-07-06 ENCOUNTER — LAB (OUTPATIENT)
Dept: FAMILY MEDICINE | Facility: CLINIC | Age: 48
End: 2023-07-06

## 2023-07-06 VITALS
DIASTOLIC BLOOD PRESSURE: 78 MMHG | HEIGHT: 62 IN | RESPIRATION RATE: 16 BRPM | SYSTOLIC BLOOD PRESSURE: 120 MMHG | HEART RATE: 68 BPM | OXYGEN SATURATION: 99 % | TEMPERATURE: 98.2 F | BODY MASS INDEX: 23.19 KG/M2 | WEIGHT: 126 LBS

## 2023-07-06 DIAGNOSIS — Z01.419 ENCOUNTER FOR GYNECOLOGICAL EXAMINATION WITHOUT ABNORMAL FINDING: Primary | ICD-10-CM

## 2023-07-06 DIAGNOSIS — Z12.11 SCREEN FOR COLON CANCER: ICD-10-CM

## 2023-07-06 DIAGNOSIS — D35.2 PROLACTINOMA (H): ICD-10-CM

## 2023-07-06 DIAGNOSIS — Z12.31 VISIT FOR SCREENING MAMMOGRAM: ICD-10-CM

## 2023-07-06 DIAGNOSIS — Z12.4 CERVICAL CANCER SCREENING: ICD-10-CM

## 2023-07-06 DIAGNOSIS — E22.9 HYPERFUNCTION OF PITUITARY GLAND, UNSPECIFIED (H): ICD-10-CM

## 2023-07-06 DIAGNOSIS — E05.00 GRAVES' DISEASE: ICD-10-CM

## 2023-07-06 LAB
PROLACTIN SERPL 3RD IS-MCNC: 16 NG/ML (ref 5–23)
T3FREE SERPL-MCNC: 2.8 PG/ML (ref 2–4.4)
T4 FREE SERPL-MCNC: 1.18 NG/DL (ref 0.9–1.7)
TSH SERPL DL<=0.005 MIU/L-ACNC: 1.27 UIU/ML (ref 0.3–4.2)

## 2023-07-06 PROCEDURE — 36415 COLL VENOUS BLD VENIPUNCTURE: CPT | Performed by: NURSE PRACTITIONER

## 2023-07-06 PROCEDURE — 99396 PREV VISIT EST AGE 40-64: CPT | Performed by: NURSE PRACTITIONER

## 2023-07-06 PROCEDURE — 84146 ASSAY OF PROLACTIN: CPT | Performed by: NURSE PRACTITIONER

## 2023-07-06 PROCEDURE — 84481 FREE ASSAY (FT-3): CPT | Performed by: NURSE PRACTITIONER

## 2023-07-06 PROCEDURE — 84443 ASSAY THYROID STIM HORMONE: CPT | Performed by: NURSE PRACTITIONER

## 2023-07-06 PROCEDURE — G0145 SCR C/V CYTO,THINLAYER,RESCR: HCPCS | Performed by: NURSE PRACTITIONER

## 2023-07-06 PROCEDURE — 84439 ASSAY OF FREE THYROXINE: CPT | Performed by: NURSE PRACTITIONER

## 2023-07-06 PROCEDURE — 87624 HPV HI-RISK TYP POOLED RSLT: CPT | Performed by: NURSE PRACTITIONER

## 2023-07-06 ASSESSMENT — ENCOUNTER SYMPTOMS
PARESTHESIAS: 0
DYSURIA: 0
SHORTNESS OF BREATH: 0
CHILLS: 0
JOINT SWELLING: 0
ARTHRALGIAS: 0
HEADACHES: 0
FREQUENCY: 0
WEAKNESS: 0
HEMATURIA: 0
MYALGIAS: 0
NERVOUS/ANXIOUS: 0
PALPITATIONS: 0
ABDOMINAL PAIN: 0
HEARTBURN: 0
FEVER: 0
COUGH: 0
BREAST MASS: 0
SORE THROAT: 0
EYE PAIN: 0
DIARRHEA: 0
HEMATOCHEZIA: 0
NAUSEA: 0
CONSTIPATION: 0
DIZZINESS: 0

## 2023-07-06 ASSESSMENT — PAIN SCALES - GENERAL: PAINLEVEL: NO PAIN (0)

## 2023-07-06 NOTE — PROGRESS NOTES
SUBJECTIVE:   CC: Soni is an 47 year old who presents for preventive health visit.        No data to display              Healthy Habits:     Getting at least 3 servings of Calcium per day:  Yes    Bi-annual eye exam:  Yes    Dental care twice a year:  Yes    Sleep apnea or symptoms of sleep apnea:  None    Diet:  Regular (no restrictions)    Frequency of exercise:  2-3 days/week    Duration of exercise:  45-60 minutes    Taking medications regularly:  Not Applicable    Medication side effects:  Not applicable    Additional concerns today:  No      Today's PHQ-2 Score:       2023     7:50 AM   PHQ-2 (  [employer])   Q1: Little interest or pleasure in doing things 0   Q2: Feeling down, depressed or hopeless 0   PHQ-2 Score 0   Q1: Little interest or pleasure in doing things Not at all   Q2: Feeling down, depressed or hopeless Not at all   PHQ-2 Score 0                   Social History     Tobacco Use     Smoking status: Never     Smokeless tobacco: Never   Substance Use Topics     Alcohol use: Yes     Comment: very rare             2023     7:50 AM   Alcohol Use   Prescreen: >3 drinks/day or >7 drinks/week? Not Applicable     Reviewed orders with patient.  Reviewed health maintenance and updated orders accordingly - Yes      Breast Cancer Screenin/23/2021     6:08 PM   Breast CA Risk Assessment (FHS-7)   Do you have a family history of breast, colon, or ovarian cancer? No / Unknown         Mammogram Screening: Recommended annual mammography  Pertinent mammograms are reviewed under the imaging tab.    History of abnormal Pap smear: YES - updated in Problem List and Health Maintenance accordingly      Latest Ref Rng & Units 2022     8:23 AM 2021     8:10 AM 2021     7:59 AM   PAP / HPV   PAP  Negative for Intraepithelial Lesion or Malignancy (NILM)      PAP (Historical)    NIL    HPV 16 DNA Negative Negative  Negative     HPV 18 DNA Negative Negative  Negative     Other HR HPV  "Negative Positive  Negative       Reviewed and updated as needed this visit by clinical staff   Tobacco  Allergies  Meds              Reviewed and updated as needed this visit by Provider                     Review of Systems   Constitutional: Negative for chills and fever.   HENT: Negative for congestion, ear pain, hearing loss and sore throat.    Eyes: Negative for pain and visual disturbance.   Respiratory: Negative for cough and shortness of breath.    Cardiovascular: Negative for chest pain, palpitations and peripheral edema.   Gastrointestinal: Negative for abdominal pain, constipation, diarrhea, heartburn, hematochezia and nausea.   Breasts:  Negative for tenderness, breast mass and discharge.   Genitourinary: Negative for dysuria, frequency, genital sores, hematuria, pelvic pain, urgency, vaginal bleeding and vaginal discharge.   Musculoskeletal: Negative for arthralgias, joint swelling and myalgias.   Skin: Negative for rash.   Neurological: Negative for dizziness, weakness, headaches and paresthesias.   Psychiatric/Behavioral: Negative for mood changes. The patient is not nervous/anxious.           OBJECTIVE:   /78 (BP Location: Right arm, Cuff Size: Adult Regular)   Pulse 68   Temp 98.2  F (36.8  C) (Tympanic)   Resp 16   Ht 1.575 m (5' 2\")   Wt 57.2 kg (126 lb)   LMP  (LMP Unknown)   SpO2 99%   BMI 23.05 kg/m    Physical Exam  GENERAL: healthy, alert and no distress  EYES: Eyes grossly normal to inspection, PERRL and conjunctivae and sclerae normal  HENT: ear canals and TM's normal, nose and mouth without ulcers or lesions  NECK: no adenopathy, no asymmetry, masses, or scars and thyroid normal to palpation  RESP: lungs clear to auscultation - no rales, rhonchi or wheezes  BREAST: normal without masses, tenderness or nipple discharge and no palpable axillary masses or adenopathy  CV: regular rate and rhythm, normal S1 S2, no S3 or S4, no murmur, click or rub, no peripheral edema and " peripheral pulses strong  ABDOMEN: soft, nontender, no hepatosplenomegaly, no masses and bowel sounds normal   (female): normal female external genitalia, normal urethral meatus, vaginal mucosa pink, moist, well rugated, and normal cervix/adnexa/uterus without masses or discharge. IUD strings present  MS: no gross musculoskeletal defects noted, no edema  SKIN: no suspicious lesions or rashes  NEURO: Normal strength and tone, mentation intact and speech normal  PSYCH: mentation appears normal, affect normal/bright        ASSESSMENT/PLAN:       ICD-10-CM    1. Encounter for gynecological examination without abnormal finding  Z01.419       2. Visit for screening mammogram  Z12.31 MA SCREENING DIGITAL BILAT - Future  (s+30)      3. Screen for colon cancer  Z12.11 COLOGUARD([employer])      4. Cervical cancer screening  Z12.4 Pap Screen with HPV - recommended age 30 - 65 years      5. Hyperfunction of pituitary gland, unspecified (H)  E22.9 Followed with endo - she is requesting labs. If abnormal, she will follow up with endo.      6. Prolactinoma (H)  D35.2 Followed with endo - she is requesting labs. If abnormal, she will follow up with endo.  Prolactin      7. Graves' disease  E05.00 Followed with endo - she is requesting labs. If abnormal, she will follow up with endo.  TSH     T4, free     T3, Free          Patient has been advised of split billing requirements and indicates understanding: Yes      COUNSELING:  Reviewed preventive health counseling, as reflected in patient instructions        She reports that she has never smoked. She has never used smokeless tobacco.      The risks, benefits and treatment options of prescribed medications or other treatments have been discussed with the patient. The patient verbalized their understanding and should call or follow up if no improvement or if they develop further problems.    MOISES Serrano [facility]

## 2023-07-06 NOTE — LETTER
July 10, 2023      Soni Moody  [address]  [place] [address]        Dear ,    We are writing to inform you of your test results.    Thyroid labs and prolactin levels were normal.     Resulted Orders   Prolactin   Result Value Ref Range    Prolactin 16 5 - 23 ng/mL   TSH   Result Value Ref Range    TSH 1.27 0.30 - 4.20 uIU/mL   T4, free   Result Value Ref Range    Free T4 1.18 0.90 - 1.70 ng/dL   T3, Free   Result Value Ref Range    T3 Free 2.8 2.0 - 4.4 pg/mL       If you have any questions or concerns, please call the clinic at the number listed above.       Sincerely,      MOISES Serrano CNP

## 2023-07-10 LAB
BKR LAB AP GYN ADEQUACY: NORMAL
BKR LAB AP GYN INTERPRETATION: NORMAL
BKR LAB AP HPV REFLEX: NORMAL
BKR LAB AP PREVIOUS ABNL DX: NORMAL
BKR LAB AP PREVIOUS ABNORMAL: NORMAL
PATH REPORT.COMMENTS IMP SPEC: NORMAL
PATH REPORT.COMMENTS IMP SPEC: NORMAL
PATH REPORT.RELEVANT HX SPEC: NORMAL

## 2023-07-12 ENCOUNTER — PATIENT OUTREACH (OUTPATIENT)
Dept: FAMILY MEDICINE | Facility: CLINIC | Age: 48
End: 2023-07-12
Payer: COMMERCIAL

## 2023-07-24 ENCOUNTER — HOSPITAL ENCOUNTER (OUTPATIENT)
Dept: MAMMOGRAPHY | Facility: CLINIC | Age: 48
Discharge: HOME | End: 2023-07-24
Attending: NURSE PRACTITIONER | Admitting: NURSE PRACTITIONER
Payer: COMMERCIAL

## 2023-07-24 DIAGNOSIS — Z12.31 VISIT FOR SCREENING MAMMOGRAM: ICD-10-CM

## 2023-07-24 PROCEDURE — 77063 BREAST TOMOSYNTHESIS BI: CPT

## 2023-07-27 LAB — NONINV COLON CA DNA+OCC BLD SCRN STL QL: NEGATIVE

## 2023-08-23 ENCOUNTER — OFFICE VISIT (OUTPATIENT)
Dept: OBGYN | Facility: CLINIC | Age: 48
End: 2023-08-23
Payer: COMMERCIAL

## 2023-08-23 VITALS
TEMPERATURE: 97 F | SYSTOLIC BLOOD PRESSURE: 111 MMHG | DIASTOLIC BLOOD PRESSURE: 70 MMHG | HEIGHT: 62 IN | BODY MASS INDEX: 23.92 KG/M2 | RESPIRATION RATE: 14 BRPM | HEART RATE: 73 BPM | WEIGHT: 130 LBS

## 2023-08-23 DIAGNOSIS — B97.7 HPV IN FEMALE: Primary | ICD-10-CM

## 2023-08-23 DIAGNOSIS — R87.810 CERVICAL HIGH RISK HUMAN PAPILLOMAVIRUS (HPV) DNA TEST POSITIVE: ICD-10-CM

## 2023-08-23 LAB
HCG UR QL: NEGATIVE
INTERNAL QC OK POCT: NORMAL
POCT KIT EXPIRATION DATE: NORMAL
POCT KIT LOT NUMBER: NORMAL

## 2023-08-23 PROCEDURE — 88305 TISSUE EXAM BY PATHOLOGIST: CPT | Performed by: PATHOLOGY

## 2023-08-23 PROCEDURE — 57454 BX/CURETT OF CERVIX W/SCOPE: CPT | Performed by: OBSTETRICS & GYNECOLOGY

## 2023-08-23 PROCEDURE — 81025 URINE PREGNANCY TEST: CPT | Performed by: OBSTETRICS & GYNECOLOGY

## 2023-08-23 NOTE — PROGRESS NOTES
COLPOSCOPY EXAM - GYN  Indication:    Encounter Diagnosis   Name Primary?    HPV in female Yes         Prior pap history and treatment: 2005, 2008, 2011 NIL paps. (Found in Care Everywhere).  2013 and 2016 NIL paps. (Found in EPIC).  4/1/19 NIL pap, + HR HPV (not 16 or 18). Plan cotest in 1 year.  3/30/21 Lost to follow-up for pap tracking   5/24/21 NIL Pap, Neg HPV. Plan cotest in 1 year.   7/1/22 NIL pap, +HR HPV (not 16/18). Plan: cotest in 1 year  7/6/23 NIL pap, +HR HPV (not 16/18). Plan: colposcopy due before 10/6/23    A pregnancy test was negative today.     PHYSICAL EXAM  Gen: alert and oriented, in no apparent distress  PELVIC EXAM  Vulva:  grossly unremarkable  Cervix: normal in appearance, grossly       HIV Status: Negative  Hx of Cervical/Cone Biopsy: no  Present Form of Contraception:  vasectomy and IUD  Tobacco Use:   Tobacco Use      Smoking status: Never      Smokeless tobacco: Never       Consent was obtained prior to the procedure.  Patient was placed in dorsal lithotomy position, speculum inserted.  The cervix was prepped with acetic acid and the colposcope focused on the cervix.  The colposcopy was Satisfactory for evaluation    Significant Findings Include: faint acetowhite  visible lesion(s) at 9 o'clock  Biopsy obtained at 9 o'clock.  Hemostasis obtained with silver nitrate.  Entire squamocolumnar junction visualized: yes  Endocervical Curettage was performed today.     All specimens were labeled and sent to pathology for pathologic correlation.    The patient tolerated the procedure well  Clinical staff  present for exam.       HPV etiology, epidemiology, transmission and prevention discussed today.  Discussed importance of not smoking and association of smoking and cervical dysplasia.    Colposcopy performed today. Await final pathology report on tissue submitted.  Patient instructed to notify office if she develops heavy vaginal bleeding (soaking through 1 pad/hour or more), severe pain,  fever or foul smelling discharge.  Patient informed that chunky, dark discharge is considered normal if Silver Nitrate was used.  Nothing per vagina; no sex, tampons or douching for 2 days.     She was advised of how long tests would take.    Ramya Roca MD ....................  8/23/2023    10:37 AM

## 2023-08-23 NOTE — NURSING NOTE
"Initial /70 (BP Location: Left arm, Patient Position: Chair, Cuff Size: Adult Regular)   Pulse 73   Temp 97  F (36.1  C) (Tympanic)   Resp 14   Ht 1.575 m (5' 2\")   Wt 59 kg (130 lb)   LMP  (LMP Unknown)   BMI 23.78 kg/m   Estimated body mass index is 23.78 kg/m  as calculated from the following:    Height as of this encounter: 1.575 m (5' 2\").    Weight as of this encounter: 59 kg (130 lb). .    Della Carrasquillo, AMINTA    "

## 2023-08-26 LAB
PATH REPORT.COMMENTS IMP SPEC: NORMAL
PATH REPORT.FINAL DX SPEC: NORMAL
PATH REPORT.FINAL DX SPEC: NORMAL
PATH REPORT.GROSS SPEC: NORMAL
PATH REPORT.GROSS SPEC: NORMAL
PATH REPORT.MICROSCOPIC SPEC OTHER STN: NORMAL
PATH REPORT.MICROSCOPIC SPEC OTHER STN: NORMAL
PATH REPORT.RELEVANT HX SPEC: NORMAL
PATH REPORT.RELEVANT HX SPEC: NORMAL
PHOTO IMAGE: NORMAL
PHOTO IMAGE: NORMAL

## 2023-08-30 ENCOUNTER — PATIENT OUTREACH (OUTPATIENT)
Dept: OBGYN | Facility: CLINIC | Age: 48
End: 2023-08-30
Payer: COMMERCIAL

## 2024-06-06 ENCOUNTER — PATIENT OUTREACH (OUTPATIENT)
Dept: CARE COORDINATION | Facility: CLINIC | Age: 49
End: 2024-06-06
Payer: COMMERCIAL

## 2024-06-20 ENCOUNTER — PATIENT OUTREACH (OUTPATIENT)
Dept: CARE COORDINATION | Facility: CLINIC | Age: 49
End: 2024-06-20
Payer: COMMERCIAL

## 2024-08-02 ENCOUNTER — PATIENT OUTREACH (OUTPATIENT)
Dept: FAMILY MEDICINE | Facility: CLINIC | Age: 49
End: 2024-08-02
Payer: COMMERCIAL

## 2024-09-15 ENCOUNTER — HEALTH MAINTENANCE LETTER (OUTPATIENT)
Age: 49
End: 2024-09-15

## 2024-10-07 ENCOUNTER — OFFICE VISIT (OUTPATIENT)
Dept: FAMILY MEDICINE | Facility: CLINIC | Age: 49
End: 2024-10-07
Payer: COMMERCIAL

## 2024-10-07 VITALS
SYSTOLIC BLOOD PRESSURE: 104 MMHG | TEMPERATURE: 97.3 F | RESPIRATION RATE: 16 BRPM | DIASTOLIC BLOOD PRESSURE: 70 MMHG | WEIGHT: 130.1 LBS | HEART RATE: 66 BPM | BODY MASS INDEX: 23.94 KG/M2 | OXYGEN SATURATION: 99 % | HEIGHT: 62 IN

## 2024-10-07 DIAGNOSIS — Z13.6 CARDIOVASCULAR SCREENING; LDL GOAL LESS THAN 130: ICD-10-CM

## 2024-10-07 DIAGNOSIS — E22.9 HYPERFUNCTION OF PITUITARY GLAND, UNSPECIFIED (H): ICD-10-CM

## 2024-10-07 DIAGNOSIS — Z13.1 SCREENING FOR DIABETES MELLITUS: ICD-10-CM

## 2024-10-07 DIAGNOSIS — Z01.419 WELL FEMALE EXAM WITH ROUTINE GYNECOLOGICAL EXAM: Primary | ICD-10-CM

## 2024-10-07 DIAGNOSIS — E05.00 GRAVES' DISEASE: ICD-10-CM

## 2024-10-07 DIAGNOSIS — D35.2 PROLACTINOMA (H): ICD-10-CM

## 2024-10-07 DIAGNOSIS — Z12.4 CERVICAL CANCER SCREENING: ICD-10-CM

## 2024-10-07 DIAGNOSIS — Z12.31 VISIT FOR SCREENING MAMMOGRAM: ICD-10-CM

## 2024-10-07 LAB
CHOLEST SERPL-MCNC: 172 MG/DL
FASTING STATUS PATIENT QL REPORTED: YES
FASTING STATUS PATIENT QL REPORTED: YES
GLUCOSE SERPL-MCNC: 88 MG/DL (ref 70–99)
HDLC SERPL-MCNC: 54 MG/DL
HPV HR 12 DNA CVX QL NAA+PROBE: NEGATIVE
HPV16 DNA CVX QL NAA+PROBE: NEGATIVE
HPV18 DNA CVX QL NAA+PROBE: NEGATIVE
HUMAN PAPILLOMA VIRUS FINAL DIAGNOSIS: NORMAL
LDLC SERPL CALC-MCNC: 100 MG/DL
NONHDLC SERPL-MCNC: 118 MG/DL
PROLACTIN SERPL 3RD IS-MCNC: 23 NG/ML (ref 5–23)
TRIGL SERPL-MCNC: 90 MG/DL
TSH SERPL DL<=0.005 MIU/L-ACNC: 1.07 UIU/ML (ref 0.3–4.2)

## 2024-10-07 PROCEDURE — 84443 ASSAY THYROID STIM HORMONE: CPT | Performed by: NURSE PRACTITIONER

## 2024-10-07 PROCEDURE — 99396 PREV VISIT EST AGE 40-64: CPT | Mod: 25 | Performed by: NURSE PRACTITIONER

## 2024-10-07 PROCEDURE — 80061 LIPID PANEL: CPT | Performed by: NURSE PRACTITIONER

## 2024-10-07 PROCEDURE — 36415 COLL VENOUS BLD VENIPUNCTURE: CPT | Performed by: NURSE PRACTITIONER

## 2024-10-07 PROCEDURE — 84146 ASSAY OF PROLACTIN: CPT | Performed by: NURSE PRACTITIONER

## 2024-10-07 PROCEDURE — 87624 HPV HI-RISK TYP POOLED RSLT: CPT | Performed by: NURSE PRACTITIONER

## 2024-10-07 PROCEDURE — 82947 ASSAY GLUCOSE BLOOD QUANT: CPT | Performed by: NURSE PRACTITIONER

## 2024-10-07 PROCEDURE — 90715 TDAP VACCINE 7 YRS/> IM: CPT | Performed by: NURSE PRACTITIONER

## 2024-10-07 PROCEDURE — 90471 IMMUNIZATION ADMIN: CPT | Performed by: NURSE PRACTITIONER

## 2024-10-07 PROCEDURE — G0145 SCR C/V CYTO,THINLAYER,RESCR: HCPCS | Performed by: NURSE PRACTITIONER

## 2024-10-07 PROCEDURE — 99459 PELVIC EXAMINATION: CPT | Performed by: NURSE PRACTITIONER

## 2024-10-07 SDOH — HEALTH STABILITY: PHYSICAL HEALTH: ON AVERAGE, HOW MANY DAYS PER WEEK DO YOU ENGAGE IN MODERATE TO STRENUOUS EXERCISE (LIKE A BRISK WALK)?: 2 DAYS

## 2024-10-07 ASSESSMENT — SOCIAL DETERMINANTS OF HEALTH (SDOH): HOW OFTEN DO YOU GET TOGETHER WITH FRIENDS OR RELATIVES?: PATIENT DECLINED

## 2024-10-07 ASSESSMENT — PAIN SCALES - GENERAL: PAINLEVEL: NO PAIN (0)

## 2024-10-07 NOTE — NURSING NOTE
Prior to immunization administration, verified patients identity using patient s name and date of birth. Please see Immunization Activity for additional information.     Screening Questionnaire for Adult Immunization    Are you sick today?   No   Do you have allergies to medications, food, a vaccine component or latex?   yes   Have you ever had a serious reaction after receiving a vaccination?   No   Do you have a long-term health problem with heart, lung, kidney, or metabolic disease (e.g., diabetes), asthma, a blood disorder, no spleen, complement component deficiency, a cochlear implant, or a spinal fluid leak?  Are you on long-term aspirin therapy?   No   Do you have cancer, leukemia, HIV/AIDS, or any other immune system problem?   No   Do you have a parent, brother, or sister with an immune system problem?   No   In the past 3 months, have you taken medications that affect  your immune system, such as prednisone, other steroids, or anticancer drugs; drugs for the treatment of rheumatoid arthritis, Crohn s disease, or psoriasis; or have you had radiation treatments?   No   Have you had a seizure, or a brain or other nervous system problem?   No   During the past year, have you received a transfusion of blood or blood    products, or been given immune (gamma) globulin or antiviral drug?   No   For women: Are you pregnant or is there a chance you could become       pregnant during the next month?   No   Have you received any vaccinations in the past 4 weeks?   No     Immunization questionnaire was positive for at least one answer.  Ok per guidelines .      Patient instructed to remain in clinic for 15 minutes afterwards, and to report any adverse reactions.     Screening performed by Cristian Jiménez CMA on 10/7/2024 at 7:45 AM.

## 2024-10-07 NOTE — PATIENT INSTRUCTIONS
Patient Education   Preventive Care Advice   This is general advice given by our system to help you stay healthy. However, your care team may have specific advice just for you. Please talk to your care team about your preventive care needs.  Nutrition  Eat 5 or more servings of fruits and vegetables each day.  Try wheat bread, brown rice and whole grain pasta (instead of white bread, rice, and pasta).  Get enough calcium and vitamin D. Check the label on foods and aim for 100% of the RDA (recommended daily allowance).  Lifestyle  Exercise at least 150 minutes each week  (30 minutes a day, 5 days a week).  Do muscle strengthening activities 2 days a week. These help control your weight and prevent disease.  No smoking.  Wear sunscreen to prevent skin cancer.  Have a dental exam and cleaning every 6 months.  Yearly exams  See your health care team every year to talk about:  Any changes in your health.  Any medicines your care team has prescribed.  Preventive care, family planning, and ways to prevent chronic diseases.  Shots (vaccines)   HPV shots (up to age 26), if you've never had them before.  Hepatitis B shots (up to age 59), if you've never had them before.  COVID-19 shot: Get this shot when it's due.  Flu shot: Get a flu shot every year.  Tetanus shot: Get a tetanus shot every 10 years.  Pneumococcal, hepatitis A, and RSV shots: Ask your care team if you need these based on your risk.  Shingles shot (for age 50 and up)  General health tests  Diabetes screening:  Starting at age 35, Get screened for diabetes at least every 3 years.  If you are younger than age 35, ask your care team if you should be screened for diabetes.  Cholesterol test: At age 39, start having a cholesterol test every 5 years, or more often if advised.  Bone density scan (DEXA): At age 50, ask your care team if you should have this scan for osteoporosis (brittle bones).  Hepatitis C: Get tested at least once in your life.  STIs (sexually  transmitted infections)  Before age 24: Ask your care team if you should be screened for STIs.  After age 24: Get screened for STIs if you're at risk. You are at risk for STIs (including HIV) if:  You are sexually active with more than one person.  You don't use condoms every time.  You or a partner was diagnosed with a sexually transmitted infection.  If you are at risk for HIV, ask about PrEP medicine to prevent HIV.  Get tested for HIV at least once in your life, whether you are at risk for HIV or not.  Cancer screening tests  Cervical cancer screening: If you have a cervix, begin getting regular cervical cancer screening tests starting at age 21.  Breast cancer scan (mammogram): If you've ever had breasts, begin having regular mammograms starting at age 40. This is a scan to check for breast cancer.  Colon cancer screening: It is important to start screening for colon cancer at age 45.  Have a colonoscopy test every 10 years (or more often if you're at risk) Or, ask your provider about stool tests like a FIT test every year or Cologuard test every 3 years.  To learn more about your testing options, visit:   .  For help making a decision, visit:   https://bit.ly/zx53130.  Prostate cancer screening test: If you have a prostate, ask your care team if a prostate cancer screening test (PSA) at age 55 is right for you.  Lung cancer screening: If you are a current or former smoker ages 50 to 80, ask your care team if ongoing lung cancer screenings are right for you.  For informational purposes only. Not to replace the advice of your health care provider. Copyright   2023 [employer]. All rights reserved. Clinically reviewed by the [facility]itions [facility]. [employer] [id number] - REV 01/24.

## 2024-10-07 NOTE — PROGRESS NOTES
Preventive Care Visit  [facility]  MOISES Serrano CNP, Family Medicine  Oct 7, 2024          Assessment & Plan     Well female exam with routine gynecological exam  Discussed with patient that her IUD can stay in for 8 years total. Should be removed or exchanged by 2/2026.    Cervical cancer screening  - HPV and Gynecologic Cytology Panel - Recommended Age 30 - 65 Years    Hyperfunction of pituitary gland, unspecified (H)  Asymptomatic, due for labs.  - Prolactin; Future  - Prolactin    Prolactinoma (H)  Asymptomatic, due for labs.  - Prolactin; Future  - Prolactin    Graves' disease  Asymptomatic, due for labs.  - TSH with free T4 reflex; Future  - TSH with free T4 reflex    Visit for screening mammogram  - MA Screen Bilateral w/Rosendo; Future    CARDIOVASCULAR SCREENING; LDL GOAL LESS THAN 130  - Lipid panel reflex to direct LDL Fasting; Future  - Lipid panel reflex to direct LDL Fasting    Screening for diabetes mellitus  - Glucose; Future  - Glucose        Counseling  Appropriate preventive services were addressed with this patient via screening, questionnaire, or discussion as appropriate for fall prevention, nutrition, physical activity, Tobacco-use cessation, social engagement, weight loss and cognition.  Checklist reviewing preventive services available has been given to the patient.  Reviewed patient's diet, addressing concerns and/or questions.   She is at risk for lack of exercise and has been provided with information to increase physical activity for the benefit of her well-being.     The risks, benefits and treatment options of prescribed medications or other treatments have been discussed with the patient. The patient verbalized their understanding and should call or follow up if no improvement or if they develop further problems.  Lottie Thomson CNP              Jennifer Shafer is a 48 year old, presenting for the following:  Physical, Health Maintenance  (Will have to see another provider- due to have IUD removed and wants replaced. Patient notified), and Imm/Inj (tdap)        10/7/2024     7:18 AM   Additional Questions   Roomed by Cristian ATKINS CMA   Accompanied by self        Health Care Directive  Patient does not have a Health Care Directive or Living Will: Discussed advance care planning with patient; information given to patient to review.    HPI  Prolactinoma and history of Graves' disease:  Previously followed by endocrinology.  Last progress notes reviewed.  Patient currently asymptomatic.  Due for surveillance lab work.            10/7/2024   General Health   How would you rate your overall physical health? Excellent   Feel stress (tense, anxious, or unable to sleep) Not at all            10/7/2024   Nutrition   Three or more servings of calcium each day? Yes   Diet: I don't know   How many servings of fruit and vegetables per day? (!) 2-3   How many sweetened beverages each day? 0-1            10/7/2024   Exercise   Days per week of moderate/strenous exercise 2 days      (!) EXERCISE CONCERN      10/7/2024   Social Factors   Frequency of gathering with friends or relatives Patient declined   Worry food won't last until get money to buy more No   Food not last or not have enough money for food? No   Do you have housing? (Housing is defined as stable permanent housing and does not include staying ouside in a car, in a tent, in an abandoned building, in an overnight shelter, or couch-surfing.) Yes   Are you worried about losing your housing? No   Lack of transportation? Patient declined   Unable to get utilities (heat,electricity)? Patient declined            10/7/2024   Dental   Dentist two times every year? Yes            10/7/2024   TB Screening   Were you born outside of the [place]? Yes            Today's PHQ-2 Score:       10/7/2024     7:15 AM   PHQ-2 ( 1999 [employer])   Q1: Little interest or pleasure in doing things 0   Q2: Feeling down, depressed or hopeless 0    PHQ-2 Score 0   Q1: Little interest or pleasure in doing things Not at all   Q2: Feeling down, depressed or hopeless Not at all   PHQ-2 Score 0           10/7/2024   Substance Use   Alcohol more than 3/day or more than 7/wk No   Do you use any other substances recreationally? No        Social History     Tobacco Use    Smoking status: Never    Smokeless tobacco: Never   Vaping Use    Vaping status: Never Used   Substance Use Topics    Alcohol use: Yes     Comment: very rare    Drug use: No           7/24/2023   LAST FHS-7 RESULTS   1st degree relative breast or ovarian cancer No   Any relative bilateral breast cancer Unknown   Any male have breast cancer No   Any ONE woman have BOTH breast AND ovarian cancer No   Any woman with breast cancer before 50yrs Yes   2 or more relatives with breast AND/OR ovarian cancer No   2 or more relatives with breast AND/OR bowel cancer No                   10/7/2024   STI Screening   New sexual partner(s) since last STI/HIV test? No        History of abnormal Pap smear: YES - reflected in Problem List and Health Maintenance accordingly        Latest Ref Rng & Units 7/6/2023     8:05 AM 7/1/2022     8:23 AM 5/24/2021     8:10 AM   PAP / HPV   PAP  Negative for Intraepithelial Lesion or Malignancy (NILM)  Negative for Intraepithelial Lesion or Malignancy (NILM)     HPV 16 DNA Negative Negative  Negative  Negative    HPV 18 DNA Negative Negative  Negative  Negative    Other HR HPV Negative Positive  Positive  Negative      ASCVD Risk   The 10-year ASCVD risk score (Hernan ROSADO, et al., 2019) is: 0.4%    Values used to calculate the score:      Age: 48 years      Sex: Female      Is Non- : No      Diabetic: No      Tobacco smoker: No      Systolic Blood Pressure: 104 mmHg      Is BP treated: No      HDL Cholesterol: 66 mg/dL      Total Cholesterol: 194 mg/dL        10/7/2024   Contraception/Family Planning   Questions about contraception or family  "planning (!) YES            Reviewed and updated as needed this visit by Provider                          Review of Systems  Constitutional, neuro, ENT, endocrine, pulmonary, cardiac, gastrointestinal, genitourinary, musculoskeletal, integument and psychiatric systems are negative, except as otherwise noted.     Objective    Exam  /70 (BP Location: Right arm, Patient Position: Sitting, Cuff Size: Adult Regular)   Pulse 66   Temp 97.3  F (36.3  C) (Tympanic)   Resp 16   Ht 1.581 m (5' 2.25\")   Wt 59 kg (130 lb 1.6 oz)   SpO2 99%   BMI 23.60 kg/m     Estimated body mass index is 23.6 kg/m  as calculated from the following:    Height as of this encounter: 1.581 m (5' 2.25\").    Weight as of this encounter: 59 kg (130 lb 1.6 oz).    Physical Exam  GENERAL: alert and no distress  EYES: Eyes grossly normal to inspection, PERRL and conjunctivae and sclerae normal  HENT: ear canals and TM's normal, nose and mouth without ulcers or lesions  NECK: no adenopathy, no asymmetry, masses, or scars  RESP: lungs clear to auscultation - no rales, rhonchi or wheezes  BREAST: normal without masses, tenderness or nipple discharge and no palpable axillary masses or adenopathy  CV: regular rate and rhythm, normal S1 S2, no S3 or S4, no murmur, click or rub, no peripheral edema  ABDOMEN: soft, nontender, no hepatosplenomegaly, no masses and bowel sounds normal   (female): normal female external genitalia, normal urethral meatus, normal vaginal mucosa.  IUD strings visible from cervical os.  MS: no gross musculoskeletal defects noted, no edema  SKIN: no suspicious lesions or rashes  NEURO: Normal strength and tone, mentation intact and speech normal  PSYCH: mentation appears normal, affect normal/bright        Signed Electronically by: MOISES Serrano CNP    "

## 2024-10-10 LAB
BKR AP ASSOCIATED HPV REPORT: NORMAL
BKR LAB AP GYN ADEQUACY: NORMAL
BKR LAB AP GYN INTERPRETATION: NORMAL
BKR LAB AP PREVIOUS ABNL DX: NORMAL
BKR LAB AP PREVIOUS ABNORMAL: NORMAL
PATH REPORT.COMMENTS IMP SPEC: NORMAL
PATH REPORT.COMMENTS IMP SPEC: NORMAL
PATH REPORT.RELEVANT HX SPEC: NORMAL

## 2024-10-25 ENCOUNTER — HOSPITAL ENCOUNTER (OUTPATIENT)
Dept: MAMMOGRAPHY | Facility: CLINIC | Age: 49
Discharge: HOME | End: 2024-10-25
Attending: NURSE PRACTITIONER | Admitting: NURSE PRACTITIONER
Payer: COMMERCIAL

## 2024-10-25 DIAGNOSIS — Z12.31 VISIT FOR SCREENING MAMMOGRAM: ICD-10-CM

## 2024-10-25 PROCEDURE — 77063 BREAST TOMOSYNTHESIS BI: CPT
